# Patient Record
Sex: MALE | Race: WHITE | NOT HISPANIC OR LATINO | Employment: OTHER
[De-identification: names, ages, dates, MRNs, and addresses within clinical notes are randomized per-mention and may not be internally consistent; named-entity substitution may affect disease eponyms.]

---

## 2017-01-04 ENCOUNTER — RECORDS - HEALTHEAST (OUTPATIENT)
Dept: ADMINISTRATIVE | Facility: OTHER | Age: 67
End: 2017-01-04

## 2017-01-31 ENCOUNTER — RECORDS - HEALTHEAST (OUTPATIENT)
Dept: ADMINISTRATIVE | Facility: OTHER | Age: 67
End: 2017-01-31

## 2017-02-07 ENCOUNTER — RECORDS - HEALTHEAST (OUTPATIENT)
Dept: ADMINISTRATIVE | Facility: OTHER | Age: 67
End: 2017-02-07

## 2017-02-14 ENCOUNTER — COMMUNICATION - HEALTHEAST (OUTPATIENT)
Dept: PULMONOLOGY | Facility: OTHER | Age: 67
End: 2017-02-14

## 2017-02-14 ENCOUNTER — RECORDS - HEALTHEAST (OUTPATIENT)
Dept: ADMINISTRATIVE | Facility: OTHER | Age: 67
End: 2017-02-14

## 2017-02-17 ENCOUNTER — AMBULATORY - HEALTHEAST (OUTPATIENT)
Dept: PULMONOLOGY | Facility: OTHER | Age: 67
End: 2017-02-17

## 2017-02-17 ENCOUNTER — COMMUNICATION - HEALTHEAST (OUTPATIENT)
Dept: PULMONOLOGY | Facility: OTHER | Age: 67
End: 2017-02-17

## 2017-02-17 DIAGNOSIS — R05.3 PERSISTENT COUGH: ICD-10-CM

## 2017-03-13 ENCOUNTER — RECORDS - HEALTHEAST (OUTPATIENT)
Dept: ADMINISTRATIVE | Facility: OTHER | Age: 67
End: 2017-03-13

## 2017-03-13 ENCOUNTER — RECORDS - HEALTHEAST (OUTPATIENT)
Dept: PULMONOLOGY | Facility: OTHER | Age: 67
End: 2017-03-13

## 2017-03-13 ENCOUNTER — OFFICE VISIT - HEALTHEAST (OUTPATIENT)
Dept: PULMONOLOGY | Facility: OTHER | Age: 67
End: 2017-03-13

## 2017-03-13 DIAGNOSIS — R05.3 CHRONIC COUGH: ICD-10-CM

## 2017-03-13 DIAGNOSIS — R05.8 POST-VIRAL COUGH SYNDROME: ICD-10-CM

## 2017-03-13 DIAGNOSIS — R05.8 UPPER AIRWAY COUGH SYNDROME: ICD-10-CM

## 2017-03-13 DIAGNOSIS — R05.9 COUGH: ICD-10-CM

## 2017-03-13 ASSESSMENT — MIFFLIN-ST. JEOR: SCORE: 1607.7

## 2017-04-20 ENCOUNTER — COMMUNICATION - HEALTHEAST (OUTPATIENT)
Dept: CARDIOLOGY | Facility: CLINIC | Age: 67
End: 2017-04-20

## 2017-04-25 ENCOUNTER — AMBULATORY - HEALTHEAST (OUTPATIENT)
Dept: CARDIOLOGY | Facility: CLINIC | Age: 67
End: 2017-04-25

## 2017-04-25 ENCOUNTER — OFFICE VISIT - HEALTHEAST (OUTPATIENT)
Dept: CARDIOLOGY | Facility: CLINIC | Age: 67
End: 2017-04-25

## 2017-04-25 DIAGNOSIS — I25.118 CORONARY ARTERY DISEASE OF NATIVE ARTERY OF NATIVE HEART WITH STABLE ANGINA PECTORIS (H): ICD-10-CM

## 2017-04-25 DIAGNOSIS — I10 ESSENTIAL HYPERTENSION WITH GOAL BLOOD PRESSURE LESS THAN 140/90: ICD-10-CM

## 2017-04-25 DIAGNOSIS — E78.2 MIXED HYPERLIPIDEMIA: ICD-10-CM

## 2017-04-25 ASSESSMENT — MIFFLIN-ST. JEOR: SCORE: 1606.33

## 2017-04-28 ENCOUNTER — COMMUNICATION - HEALTHEAST (OUTPATIENT)
Dept: CARDIOLOGY | Facility: CLINIC | Age: 67
End: 2017-04-28

## 2017-05-01 ENCOUNTER — OFFICE VISIT - HEALTHEAST (OUTPATIENT)
Dept: PULMONOLOGY | Facility: OTHER | Age: 67
End: 2017-05-01

## 2017-05-01 DIAGNOSIS — R05.8 UPPER AIRWAY COUGH SYNDROME: ICD-10-CM

## 2017-05-01 DIAGNOSIS — R05.8 POST-VIRAL COUGH SYNDROME: ICD-10-CM

## 2017-05-01 DIAGNOSIS — R00.2 PALPITATIONS: ICD-10-CM

## 2017-05-04 ENCOUNTER — HOSPITAL ENCOUNTER (OUTPATIENT)
Dept: NUCLEAR MEDICINE | Facility: HOSPITAL | Age: 67
Discharge: HOME | End: 2017-05-04
Attending: INTERNAL MEDICINE

## 2017-05-04 ENCOUNTER — HOSPITAL ENCOUNTER (OUTPATIENT)
Dept: CARDIOLOGY | Facility: HOSPITAL | Age: 67
Discharge: HOME | End: 2017-05-04
Attending: INTERNAL MEDICINE

## 2017-05-04 DIAGNOSIS — I25.118 CORONARY ARTERY DISEASE OF NATIVE ARTERY OF NATIVE HEART WITH STABLE ANGINA PECTORIS (H): ICD-10-CM

## 2017-05-04 LAB
CV STRESS CURRENT BP HE: NORMAL
CV STRESS CURRENT HR HE: 103
CV STRESS CURRENT HR HE: 104
CV STRESS CURRENT HR HE: 105
CV STRESS CURRENT HR HE: 107
CV STRESS CURRENT HR HE: 119
CV STRESS CURRENT HR HE: 132
CV STRESS CURRENT HR HE: 132
CV STRESS CURRENT HR HE: 133
CV STRESS CURRENT HR HE: 134
CV STRESS CURRENT HR HE: 135
CV STRESS CURRENT HR HE: 137
CV STRESS CURRENT HR HE: 137
CV STRESS CURRENT HR HE: 138
CV STRESS CURRENT HR HE: 142
CV STRESS CURRENT HR HE: 142
CV STRESS CURRENT HR HE: 148
CV STRESS CURRENT HR HE: 149
CV STRESS CURRENT HR HE: 73
CV STRESS CURRENT HR HE: 79
CV STRESS CURRENT HR HE: 87
CV STRESS CURRENT HR HE: 88
CV STRESS CURRENT HR HE: 89
CV STRESS CURRENT HR HE: 90
CV STRESS CURRENT HR HE: 93
CV STRESS CURRENT HR HE: 96
CV STRESS CURRENT HR HE: 96
CV STRESS CURRENT HR HE: 97
CV STRESS CURRENT HR HE: 99
CV STRESS DEVIATION TIME HE: NORMAL
CV STRESS ECHO PERCENT HR HE: NORMAL
CV STRESS EXERCISE STAGE HE: NORMAL
CV STRESS EXERCISE STAGE REACHED HE: NORMAL
CV STRESS FINAL RESTING BP HE: NORMAL
CV STRESS FINAL RESTING HR HE: 87
CV STRESS MAX HR HE: 150
CV STRESS MAX TREADMILL GRADE HE: 14
CV STRESS MAX TREADMILL SPEED HE: 3.4
CV STRESS PEAK DIA BP HE: NORMAL
CV STRESS PEAK SYS BP HE: NORMAL
CV STRESS PHASE HE: NORMAL
CV STRESS PROTOCOL HE: NORMAL
CV STRESS RESTING PT POSITION HE: NORMAL
CV STRESS RESTING PT POSITION HE: NORMAL
CV STRESS ST DEVIATION AMOUNT HE: NORMAL
CV STRESS ST DEVIATION ELEVATION HE: NORMAL
CV STRESS ST EVELATION AMOUNT HE: NORMAL
CV STRESS TEST TYPE HE: NORMAL
CV STRESS TOTAL STAGE TIME MIN 1 HE: NORMAL
NUC STRESS EJECTION FRACTION: 53 %
STRESS ECHO BASELINE BP: NORMAL
STRESS ECHO BASELINE HR: 74
STRESS ECHO CALCULATED PERCENT HR: 96 %
STRESS ECHO LAST STRESS BP: NORMAL
STRESS ECHO LAST STRESS HR: 149
STRESS ECHO POST ESTIMATED WORKLOAD: 8.9
STRESS ECHO POST EXERCISE DUR MIN: 7
STRESS ECHO POST EXERCISE DUR SEC: 25
STRESS ECHO TARGET HR: 133

## 2017-07-14 ENCOUNTER — OFFICE VISIT - HEALTHEAST (OUTPATIENT)
Dept: CARDIOLOGY | Facility: CLINIC | Age: 67
End: 2017-07-14

## 2017-07-14 DIAGNOSIS — I25.10 CORONARY ARTERY DISEASE INVOLVING NATIVE CORONARY ARTERY OF NATIVE HEART WITHOUT ANGINA PECTORIS: ICD-10-CM

## 2017-07-14 DIAGNOSIS — I49.3 FREQUENT PVCS: ICD-10-CM

## 2017-07-14 DIAGNOSIS — I10 ESSENTIAL HYPERTENSION WITH GOAL BLOOD PRESSURE LESS THAN 140/90: ICD-10-CM

## 2017-07-14 DIAGNOSIS — I25.10 ATHEROSCLEROSIS OF NATIVE CORONARY ARTERY OF NATIVE HEART WITHOUT ANGINA PECTORIS: ICD-10-CM

## 2017-07-14 ASSESSMENT — MIFFLIN-ST. JEOR: SCORE: 1623.79

## 2018-02-21 ENCOUNTER — COMMUNICATION - HEALTHEAST (OUTPATIENT)
Dept: CARDIOLOGY | Facility: CLINIC | Age: 68
End: 2018-02-21

## 2018-02-21 DIAGNOSIS — I25.118 CORONARY ARTERY DISEASE OF NATIVE ARTERY OF NATIVE HEART WITH STABLE ANGINA PECTORIS (H): ICD-10-CM

## 2018-03-06 ENCOUNTER — COMMUNICATION - HEALTHEAST (OUTPATIENT)
Dept: CARDIOLOGY | Facility: CLINIC | Age: 68
End: 2018-03-06

## 2018-03-06 DIAGNOSIS — Z79.899 MEDICATION THERAPY CHANGED: ICD-10-CM

## 2018-03-06 DIAGNOSIS — I10 HTN (HYPERTENSION): ICD-10-CM

## 2018-05-08 ENCOUNTER — COMMUNICATION - HEALTHEAST (OUTPATIENT)
Dept: ADMINISTRATIVE | Facility: CLINIC | Age: 68
End: 2018-05-08

## 2018-05-23 ENCOUNTER — COMMUNICATION - HEALTHEAST (OUTPATIENT)
Dept: ADMINISTRATIVE | Facility: CLINIC | Age: 68
End: 2018-05-23

## 2018-08-28 ENCOUNTER — OFFICE VISIT - HEALTHEAST (OUTPATIENT)
Dept: CARDIOLOGY | Facility: CLINIC | Age: 68
End: 2018-08-28

## 2018-08-28 DIAGNOSIS — I10 ESSENTIAL HYPERTENSION: ICD-10-CM

## 2018-08-28 DIAGNOSIS — I49.3 FREQUENT PVCS: ICD-10-CM

## 2018-08-28 DIAGNOSIS — I25.10 ATHEROSCLEROSIS OF NATIVE CORONARY ARTERY OF NATIVE HEART WITHOUT ANGINA PECTORIS: ICD-10-CM

## 2018-08-28 ASSESSMENT — MIFFLIN-ST. JEOR: SCORE: 1613.14

## 2018-09-04 ENCOUNTER — COMMUNICATION - HEALTHEAST (OUTPATIENT)
Dept: CARDIOLOGY | Facility: CLINIC | Age: 68
End: 2018-09-04

## 2018-09-04 DIAGNOSIS — I25.118 CORONARY ARTERY DISEASE OF NATIVE ARTERY OF NATIVE HEART WITH STABLE ANGINA PECTORIS (H): ICD-10-CM

## 2018-09-05 RX ORDER — ISOSORBIDE MONONITRATE 60 MG/1
TABLET, EXTENDED RELEASE ORAL
Qty: 90 TABLET | Refills: 2 | Status: SHIPPED | OUTPATIENT
Start: 2018-09-05 | End: 2021-11-19

## 2018-10-30 ENCOUNTER — COMMUNICATION - HEALTHEAST (OUTPATIENT)
Dept: CARDIOLOGY | Facility: CLINIC | Age: 68
End: 2018-10-30

## 2018-10-30 DIAGNOSIS — I25.10 CORONARY ARTERY DISEASE INVOLVING NATIVE CORONARY ARTERY OF NATIVE HEART WITHOUT ANGINA PECTORIS: ICD-10-CM

## 2019-04-09 ENCOUNTER — COMMUNICATION - HEALTHEAST (OUTPATIENT)
Dept: CARDIOLOGY | Facility: CLINIC | Age: 69
End: 2019-04-09

## 2019-04-09 DIAGNOSIS — I10 HTN (HYPERTENSION): ICD-10-CM

## 2019-04-09 RX ORDER — LOSARTAN POTASSIUM 25 MG/1
25 TABLET ORAL DAILY
Qty: 90 TABLET | Refills: 1 | Status: SHIPPED | OUTPATIENT
Start: 2019-04-09

## 2019-05-09 ENCOUNTER — COMMUNICATION - HEALTHEAST (OUTPATIENT)
Dept: CARDIOLOGY | Facility: CLINIC | Age: 69
End: 2019-05-09

## 2019-05-09 DIAGNOSIS — I25.10 CORONARY ARTERY DISEASE INVOLVING NATIVE CORONARY ARTERY OF NATIVE HEART WITHOUT ANGINA PECTORIS: ICD-10-CM

## 2019-08-06 ENCOUNTER — RECORDS - HEALTHEAST (OUTPATIENT)
Dept: ADMINISTRATIVE | Facility: OTHER | Age: 69
End: 2019-08-06

## 2019-08-13 ENCOUNTER — COMMUNICATION - HEALTHEAST (OUTPATIENT)
Dept: TELEHEALTH | Facility: CLINIC | Age: 69
End: 2019-08-13

## 2019-08-14 ENCOUNTER — OFFICE VISIT - HEALTHEAST (OUTPATIENT)
Dept: CARDIOLOGY | Facility: TELEHEALTH | Age: 69
End: 2019-08-14

## 2019-08-14 DIAGNOSIS — E78.2 MIXED HYPERLIPIDEMIA: ICD-10-CM

## 2019-08-14 DIAGNOSIS — I49.3 FREQUENT PVCS: ICD-10-CM

## 2019-08-14 DIAGNOSIS — I10 ESSENTIAL HYPERTENSION: ICD-10-CM

## 2019-08-14 DIAGNOSIS — I25.10 ATHEROSCLEROSIS OF NATIVE CORONARY ARTERY OF NATIVE HEART WITHOUT ANGINA PECTORIS: ICD-10-CM

## 2019-08-14 ASSESSMENT — MIFFLIN-ST. JEOR: SCORE: 1608.61

## 2019-08-23 ENCOUNTER — HOSPITAL ENCOUNTER (OUTPATIENT)
Dept: CARDIOLOGY | Facility: HOSPITAL | Age: 69
Discharge: HOME | End: 2019-08-23
Attending: INTERNAL MEDICINE

## 2019-08-23 ENCOUNTER — HOSPITAL ENCOUNTER (OUTPATIENT)
Dept: NUCLEAR MEDICINE | Facility: HOSPITAL | Age: 69
Discharge: HOME | End: 2019-08-23
Attending: INTERNAL MEDICINE

## 2019-08-23 DIAGNOSIS — I49.3 FREQUENT PVCS: ICD-10-CM

## 2019-08-23 DIAGNOSIS — I25.10 ATHEROSCLEROSIS OF NATIVE CORONARY ARTERY OF NATIVE HEART WITHOUT ANGINA PECTORIS: ICD-10-CM

## 2019-08-23 DIAGNOSIS — E78.2 MIXED HYPERLIPIDEMIA: ICD-10-CM

## 2019-08-23 DIAGNOSIS — I10 ESSENTIAL HYPERTENSION: ICD-10-CM

## 2019-08-23 LAB
CV STRESS CURRENT BP HE: NORMAL
CV STRESS CURRENT HR HE: 100
CV STRESS CURRENT HR HE: 103
CV STRESS CURRENT HR HE: 106
CV STRESS CURRENT HR HE: 110
CV STRESS CURRENT HR HE: 111
CV STRESS CURRENT HR HE: 112
CV STRESS CURRENT HR HE: 112
CV STRESS CURRENT HR HE: 123
CV STRESS CURRENT HR HE: 128
CV STRESS CURRENT HR HE: 132
CV STRESS CURRENT HR HE: 135
CV STRESS CURRENT HR HE: 141
CV STRESS CURRENT HR HE: 142
CV STRESS CURRENT HR HE: 142
CV STRESS CURRENT HR HE: 144
CV STRESS CURRENT HR HE: 146
CV STRESS CURRENT HR HE: 146
CV STRESS CURRENT HR HE: 147
CV STRESS CURRENT HR HE: 75
CV STRESS CURRENT HR HE: 86
CV STRESS CURRENT HR HE: 86
CV STRESS CURRENT HR HE: 88
CV STRESS CURRENT HR HE: 88
CV STRESS CURRENT HR HE: 89
CV STRESS CURRENT HR HE: 90
CV STRESS CURRENT HR HE: 94
CV STRESS CURRENT HR HE: 95
CV STRESS CURRENT HR HE: 96
CV STRESS CURRENT HR HE: 99
CV STRESS DEVIATION TIME HE: NORMAL
CV STRESS ECHO PERCENT HR HE: NORMAL
CV STRESS EXERCISE STAGE HE: NORMAL
CV STRESS EXERCISE STAGE REACHED HE: NORMAL
CV STRESS FINAL RESTING BP HE: NORMAL
CV STRESS FINAL RESTING HR HE: 88
CV STRESS MAX HR HE: 147
CV STRESS MAX TREADMILL GRADE HE: 12
CV STRESS MAX TREADMILL SPEED HE: 2.5
CV STRESS PEAK DIA BP HE: NORMAL
CV STRESS PEAK SYS BP HE: NORMAL
CV STRESS PHASE HE: NORMAL
CV STRESS PROTOCOL HE: NORMAL
CV STRESS RESTING PT POSITION HE: NORMAL
CV STRESS RESTING PT POSITION HE: NORMAL
CV STRESS ST DEVIATION AMOUNT HE: NORMAL
CV STRESS ST DEVIATION ELEVATION HE: NORMAL
CV STRESS ST EVELATION AMOUNT HE: NORMAL
CV STRESS TEST TYPE HE: NORMAL
CV STRESS TOTAL STAGE TIME MIN 1 HE: NORMAL
NUC STRESS EJECTION FRACTION: 62 %
STRESS ECHO BASELINE BP: NORMAL
STRESS ECHO BASELINE HR: 75
STRESS ECHO CALCULATED PERCENT HR: 95 %
STRESS ECHO LAST STRESS BP: NORMAL
STRESS ECHO LAST STRESS HR: 146
STRESS ECHO POST ESTIMATED WORKLOAD: 7.1
STRESS ECHO POST EXERCISE DUR MIN: 6
STRESS ECHO POST EXERCISE DUR SEC: 7
STRESS ECHO TARGET HR: 131

## 2019-08-23 ASSESSMENT — MIFFLIN-ST. JEOR: SCORE: 1570.05

## 2019-08-26 ENCOUNTER — AMBULATORY - HEALTHEAST (OUTPATIENT)
Dept: CARDIOLOGY | Facility: CLINIC | Age: 69
End: 2019-08-26

## 2019-08-26 DIAGNOSIS — I25.10 ATHEROSCLEROSIS OF NATIVE CORONARY ARTERY OF NATIVE HEART WITHOUT ANGINA PECTORIS: ICD-10-CM

## 2019-08-27 ENCOUNTER — SURGERY - HEALTHEAST (OUTPATIENT)
Dept: CARDIOLOGY | Facility: CLINIC | Age: 69
End: 2019-08-27

## 2019-08-27 ENCOUNTER — COMMUNICATION - HEALTHEAST (OUTPATIENT)
Dept: CARDIOLOGY | Facility: CLINIC | Age: 69
End: 2019-08-27

## 2019-08-28 ENCOUNTER — SURGERY - HEALTHEAST (OUTPATIENT)
Dept: CARDIOLOGY | Facility: CLINIC | Age: 69
End: 2019-08-28

## 2019-08-28 ASSESSMENT — MIFFLIN-ST. JEOR: SCORE: 1561.88

## 2019-08-30 ENCOUNTER — COMMUNICATION - HEALTHEAST (OUTPATIENT)
Dept: CARDIOLOGY | Facility: CLINIC | Age: 69
End: 2019-08-30

## 2019-08-30 DIAGNOSIS — I25.10 ATHEROSCLEROSIS OF NATIVE CORONARY ARTERY OF NATIVE HEART WITHOUT ANGINA PECTORIS: ICD-10-CM

## 2019-08-30 DIAGNOSIS — E78.2 MIXED HYPERLIPIDEMIA: ICD-10-CM

## 2019-08-30 DIAGNOSIS — I10 ESSENTIAL HYPERTENSION: ICD-10-CM

## 2019-12-04 ENCOUNTER — COMMUNICATION - HEALTHEAST (OUTPATIENT)
Dept: CARDIOLOGY | Facility: CLINIC | Age: 69
End: 2019-12-04

## 2019-12-04 DIAGNOSIS — I25.10 ATHEROSCLEROSIS OF NATIVE CORONARY ARTERY OF NATIVE HEART WITHOUT ANGINA PECTORIS: ICD-10-CM

## 2019-12-04 DIAGNOSIS — I10 ESSENTIAL HYPERTENSION: ICD-10-CM

## 2020-02-17 ENCOUNTER — AMBULATORY - HEALTHEAST (OUTPATIENT)
Dept: CARDIOLOGY | Facility: CLINIC | Age: 70
End: 2020-02-17

## 2020-02-19 ENCOUNTER — COMMUNICATION - HEALTHEAST (OUTPATIENT)
Dept: CARDIOLOGY | Facility: CLINIC | Age: 70
End: 2020-02-19

## 2020-02-19 DIAGNOSIS — E78.1 HYPERTRIGLYCERIDEMIA: ICD-10-CM

## 2020-02-25 ENCOUNTER — AMBULATORY - HEALTHEAST (OUTPATIENT)
Dept: CARDIOLOGY | Facility: CLINIC | Age: 70
End: 2020-02-25

## 2020-03-06 ENCOUNTER — RECORDS - HEALTHEAST (OUTPATIENT)
Dept: CARDIOLOGY | Facility: CLINIC | Age: 70
End: 2020-03-06

## 2020-03-06 ENCOUNTER — COMMUNICATION - HEALTHEAST (OUTPATIENT)
Dept: CARDIOLOGY | Facility: CLINIC | Age: 70
End: 2020-03-06

## 2020-03-06 DIAGNOSIS — E78.2 MIXED HYPERLIPIDEMIA: ICD-10-CM

## 2020-03-23 ENCOUNTER — COMMUNICATION - HEALTHEAST (OUTPATIENT)
Dept: CARDIOLOGY | Facility: CLINIC | Age: 70
End: 2020-03-23

## 2020-03-23 DIAGNOSIS — E78.2 MIXED HYPERLIPIDEMIA: ICD-10-CM

## 2020-06-11 ENCOUNTER — COMMUNICATION - HEALTHEAST (OUTPATIENT)
Dept: CARDIOLOGY | Facility: CLINIC | Age: 70
End: 2020-06-11

## 2020-06-11 DIAGNOSIS — I10 ESSENTIAL HYPERTENSION: ICD-10-CM

## 2020-06-11 DIAGNOSIS — I25.10 ATHEROSCLEROSIS OF NATIVE CORONARY ARTERY OF NATIVE HEART WITHOUT ANGINA PECTORIS: ICD-10-CM

## 2020-08-14 ENCOUNTER — COMMUNICATION - HEALTHEAST (OUTPATIENT)
Dept: CARDIOLOGY | Facility: CLINIC | Age: 70
End: 2020-08-14

## 2020-08-14 DIAGNOSIS — R00.2 PALPITATIONS: ICD-10-CM

## 2020-08-19 ENCOUNTER — COMMUNICATION - HEALTHEAST (OUTPATIENT)
Dept: CARDIOLOGY | Facility: CLINIC | Age: 70
End: 2020-08-19

## 2020-08-20 ENCOUNTER — OFFICE VISIT - HEALTHEAST (OUTPATIENT)
Dept: CARDIOLOGY | Facility: CLINIC | Age: 70
End: 2020-08-20

## 2020-08-20 DIAGNOSIS — I25.10 ATHEROSCLEROSIS OF NATIVE CORONARY ARTERY OF NATIVE HEART WITHOUT ANGINA PECTORIS: ICD-10-CM

## 2020-08-20 DIAGNOSIS — I49.3 FREQUENT PVCS: ICD-10-CM

## 2020-08-20 DIAGNOSIS — I10 ESSENTIAL HYPERTENSION: ICD-10-CM

## 2020-08-20 DIAGNOSIS — Z90.79 S/P PROSTATECTOMY: ICD-10-CM

## 2020-08-20 DIAGNOSIS — R00.2 PALPITATIONS: ICD-10-CM

## 2020-08-20 LAB
ATRIAL RATE - MUSE: 89 BPM
DIASTOLIC BLOOD PRESSURE - MUSE: NORMAL
INTERPRETATION ECG - MUSE: NORMAL
P AXIS - MUSE: 46 DEGREES
PR INTERVAL - MUSE: 156 MS
QRS DURATION - MUSE: 100 MS
QT - MUSE: 390 MS
QTC - MUSE: 474 MS
R AXIS - MUSE: 33 DEGREES
SYSTOLIC BLOOD PRESSURE - MUSE: NORMAL
T AXIS - MUSE: 8 DEGREES
VENTRICULAR RATE- MUSE: 89 BPM

## 2020-08-20 ASSESSMENT — MIFFLIN-ST. JEOR: SCORE: 1597.26

## 2020-12-04 ENCOUNTER — COMMUNICATION - HEALTHEAST (OUTPATIENT)
Dept: CARDIOLOGY | Facility: CLINIC | Age: 70
End: 2020-12-04

## 2020-12-04 DIAGNOSIS — I10 ESSENTIAL HYPERTENSION: ICD-10-CM

## 2020-12-04 DIAGNOSIS — I25.10 ATHEROSCLEROSIS OF NATIVE CORONARY ARTERY OF NATIVE HEART WITHOUT ANGINA PECTORIS: ICD-10-CM

## 2020-12-07 RX ORDER — METOPROLOL SUCCINATE 100 MG/1
100 TABLET, EXTENDED RELEASE ORAL DAILY
Qty: 90 TABLET | Refills: 1 | Status: SHIPPED | OUTPATIENT
Start: 2020-12-07

## 2021-01-04 ENCOUNTER — COMMUNICATION - HEALTHEAST (OUTPATIENT)
Dept: CARDIOLOGY | Facility: CLINIC | Age: 71
End: 2021-01-04

## 2021-01-04 DIAGNOSIS — E78.2 MIXED HYPERLIPIDEMIA: ICD-10-CM

## 2021-01-05 RX ORDER — ROSUVASTATIN CALCIUM 20 MG/1
20 TABLET, COATED ORAL AT BEDTIME
Qty: 90 TABLET | Refills: 1 | Status: ON HOLD | OUTPATIENT
Start: 2021-01-05 | End: 2023-03-22

## 2021-02-08 ENCOUNTER — COMMUNICATION - HEALTHEAST (OUTPATIENT)
Dept: CARDIOLOGY | Facility: CLINIC | Age: 71
End: 2021-02-08

## 2021-02-09 ENCOUNTER — OFFICE VISIT - HEALTHEAST (OUTPATIENT)
Dept: CARDIOLOGY | Facility: CLINIC | Age: 71
End: 2021-02-09

## 2021-02-09 ENCOUNTER — RECORDS - HEALTHEAST (OUTPATIENT)
Dept: ADMINISTRATIVE | Facility: OTHER | Age: 71
End: 2021-02-09

## 2021-02-09 DIAGNOSIS — I25.10 ATHEROSCLEROSIS OF NATIVE CORONARY ARTERY OF NATIVE HEART WITHOUT ANGINA PECTORIS: ICD-10-CM

## 2021-02-09 DIAGNOSIS — R06.09 DYSPNEA ON EXERTION: ICD-10-CM

## 2021-02-09 DIAGNOSIS — I49.3 FREQUENT PVCS: ICD-10-CM

## 2021-02-09 RX ORDER — DEXAMETHASONE 4 MG/1
TABLET ORAL
Status: SHIPPED | COMMUNITY
Start: 2021-02-03 | End: 2022-02-10

## 2021-02-09 RX ORDER — ONDANSETRON 4 MG/1
4 TABLET, ORALLY DISINTEGRATING ORAL
Status: SHIPPED | COMMUNITY
Start: 2021-02-03

## 2021-02-10 ENCOUNTER — AMBULATORY - HEALTHEAST (OUTPATIENT)
Dept: CARDIOLOGY | Facility: CLINIC | Age: 71
End: 2021-02-10

## 2021-02-10 DIAGNOSIS — I25.10 ATHEROSCLEROSIS OF NATIVE CORONARY ARTERY OF NATIVE HEART WITHOUT ANGINA PECTORIS: ICD-10-CM

## 2021-02-12 ENCOUNTER — HOSPITAL ENCOUNTER (OUTPATIENT)
Dept: CARDIOLOGY | Facility: HOSPITAL | Age: 71
Discharge: HOME | End: 2021-02-12
Attending: INTERNAL MEDICINE

## 2021-02-12 ENCOUNTER — HOSPITAL ENCOUNTER (OUTPATIENT)
Dept: NUCLEAR MEDICINE | Facility: HOSPITAL | Age: 71
Discharge: HOME | End: 2021-02-12
Attending: INTERNAL MEDICINE

## 2021-02-12 ENCOUNTER — COMMUNICATION - HEALTHEAST (OUTPATIENT)
Dept: ADMINISTRATIVE | Facility: CLINIC | Age: 71
End: 2021-02-12

## 2021-02-12 DIAGNOSIS — I25.10 ATHEROSCLEROSIS OF NATIVE CORONARY ARTERY OF NATIVE HEART WITHOUT ANGINA PECTORIS: ICD-10-CM

## 2021-02-12 DIAGNOSIS — I49.3 FREQUENT PVCS: ICD-10-CM

## 2021-02-12 DIAGNOSIS — R06.09 DYSPNEA ON EXERTION: ICD-10-CM

## 2021-02-12 LAB
AORTIC ROOT: 4.3 CM
AORTIC VALVE MEAN VELOCITY: 94.9 CM/S
ASCENDING AORTA: 3.8 CM
AV DIMENSIONLESS INDEX VTI: 1
AV MEAN GRADIENT: 4 MMHG
AV PEAK GRADIENT: 6.2 MMHG
AV VALVE AREA: 3.7 CM2
AV VELOCITY RATIO: 1
BSA FOR ECHO PROCEDURE: 1.89 M2
CV BLOOD PRESSURE: ABNORMAL MMHG
CV ECHO HEIGHT: 68 IN
CV ECHO WEIGHT: 165 LBS
CV STRESS CURRENT BP HE: NORMAL
CV STRESS CURRENT HR HE: 100
CV STRESS CURRENT HR HE: 101
CV STRESS CURRENT HR HE: 65
CV STRESS CURRENT HR HE: 67
CV STRESS CURRENT HR HE: 80
CV STRESS CURRENT HR HE: 80
CV STRESS CURRENT HR HE: 82
CV STRESS CURRENT HR HE: 82
CV STRESS CURRENT HR HE: 83
CV STRESS CURRENT HR HE: 84
CV STRESS CURRENT HR HE: 89
CV STRESS CURRENT HR HE: 90
CV STRESS CURRENT HR HE: 90
CV STRESS CURRENT HR HE: 91
CV STRESS CURRENT HR HE: 92
CV STRESS CURRENT HR HE: 93
CV STRESS CURRENT HR HE: 96
CV STRESS DEVIATION TIME HE: NORMAL
CV STRESS ECHO PERCENT HR HE: NORMAL
CV STRESS EXERCISE STAGE HE: NORMAL
CV STRESS FINAL RESTING BP HE: NORMAL
CV STRESS FINAL RESTING HR HE: 84
CV STRESS MAX HR HE: 102
CV STRESS MAX TREADMILL GRADE HE: 0
CV STRESS MAX TREADMILL SPEED HE: 0
CV STRESS PEAK DIA BP HE: NORMAL
CV STRESS PEAK SYS BP HE: NORMAL
CV STRESS PHASE HE: NORMAL
CV STRESS PROTOCOL HE: NORMAL
CV STRESS RESTING PT POSITION HE: NORMAL
CV STRESS ST DEVIATION AMOUNT HE: NORMAL
CV STRESS ST DEVIATION ELEVATION HE: NORMAL
CV STRESS ST EVELATION AMOUNT HE: NORMAL
CV STRESS TEST TYPE HE: NORMAL
CV STRESS TOTAL STAGE TIME MIN 1 HE: NORMAL
DOP CALC AO PEAK VEL: 124 CM/S
DOP CALC AO VTI: 22.2 CM
DOP CALC LVOT AREA: 3.8 CM2
DOP CALC LVOT DIAMETER: 2.2 CM
DOP CALC LVOT PEAK VEL: 123 CM/S
DOP CALC LVOT STROKE VOLUME: 81.3 CM3
DOP CALCLVOT PEAK VEL VTI: 21.4 CM
EJECTION FRACTION: 63 % (ref 55–75)
FRACTIONAL SHORTENING: 24 % (ref 28–44)
INTERVENTRICULAR SEPTUM IN END DIASTOLE: 1.31 CM (ref 0.6–1)
IVS/PW RATIO: 1
LA AREA 1: 19 CM2
LA AREA 2: 22 CM2
LEFT ATRIUM LENGTH: 5.7 CM
LEFT ATRIUM SIZE: 3.9 CM
LEFT ATRIUM TO AORTIC ROOT RATIO: 0.91 NO UNITS
LEFT ATRIUM VOLUME INDEX: 33 ML/M2
LEFT ATRIUM VOLUME: 62.3 ML
LEFT VENTRICLE CARDIAC INDEX: 2.3 L/MIN/M2
LEFT VENTRICLE CARDIAC OUTPUT: 4.4 L/MIN
LEFT VENTRICLE DIASTOLIC VOLUME INDEX: 45.2 CM3/M2 (ref 34–74)
LEFT VENTRICLE DIASTOLIC VOLUME: 85.4 CM3 (ref 62–150)
LEFT VENTRICLE HEART RATE: 54 BPM
LEFT VENTRICLE MASS INDEX: 116.4 G/M2
LEFT VENTRICLE SYSTOLIC VOLUME INDEX: 16.8 CM3/M2 (ref 11–31)
LEFT VENTRICLE SYSTOLIC VOLUME: 31.8 CM3 (ref 21–61)
LEFT VENTRICULAR INTERNAL DIMENSION IN DIASTOLE: 4.45 CM (ref 4.2–5.8)
LEFT VENTRICULAR INTERNAL DIMENSION IN SYSTOLE: 3.38 CM (ref 2.5–4)
LEFT VENTRICULAR MASS: 220.1 G
LEFT VENTRICULAR OUTFLOW TRACT MEAN GRADIENT: 3 MMHG
LEFT VENTRICULAR OUTFLOW TRACT MEAN VELOCITY: 81.5 CM/S
LEFT VENTRICULAR OUTFLOW TRACT PEAK GRADIENT: 6 MMHG
LEFT VENTRICULAR POSTERIOR WALL IN END DIASTOLE: 1.3 CM (ref 0.6–1)
LV STROKE VOLUME INDEX: 43 ML/M2
MITRAL VALVE DECELERATION SLOPE: 1640 MM/S2
MITRAL VALVE DECELERATION SLOPE: 1640 MM/S2
MITRAL VALVE E/A RATIO: 0.6
MITRAL VALVE PRESSURE HALF-TIME: 94 MS
MV AVERAGE E/E' RATIO: 6.3 CM/S
MV DECELERATION TIME: 320 MS
MV E'TISSUE VEL-LAT: 9.38 CM/S
MV E'TISSUE VEL-MED: 7.16 CM/S
MV LATERAL E/E' RATIO: 5.6
MV MEDIAL E/E' RATIO: 7.3
MV PEAK A VELOCITY: 85.9 CM/S
MV PEAK E VELOCITY: 52.4 CM/S
MV VALVE AREA PRESSURE 1/2 METHOD: 2.3 CM2
NUC REST DIASTOLIC VOLUME INDEX: 2640 LBS
NUC REST SYSTOLIC VOLUME INDEX: 68 IN
NUC STRESS EJECTION FRACTION: 74 %
RATE PRESSURE PRODUCT: NORMAL
STRESS ECHO BASELINE DIASTOLIC HE: 80
STRESS ECHO BASELINE HR: 72
STRESS ECHO BASELINE SYSTOLIC BP: 146
STRESS ECHO CALCULATED PERCENT HR: 67 %
STRESS ECHO LAST STRESS DIASTOLIC BP: 79
STRESS ECHO LAST STRESS HR: 92
STRESS ECHO LAST STRESS SYSTOLIC BP: 142
STRESS ECHO TARGET HR: 152
TRICUSPID VALVE ANULAR PLANE SYSTOLIC EXCURSION: 2.5 CM

## 2021-02-12 ASSESSMENT — MIFFLIN-ST. JEOR
SCORE: 1492.94
SCORE: 1492.94

## 2021-03-12 ENCOUNTER — TRANSFERRED RECORDS (OUTPATIENT)
Dept: HEALTH INFORMATION MANAGEMENT | Facility: CLINIC | Age: 71
End: 2021-03-12

## 2021-05-30 VITALS — BODY MASS INDEX: 28.89 KG/M2 | WEIGHT: 190 LBS

## 2021-05-30 VITALS — BODY MASS INDEX: 28.84 KG/M2 | WEIGHT: 190.3 LBS | HEIGHT: 68 IN

## 2021-05-30 VITALS — WEIGHT: 190 LBS | HEIGHT: 68 IN | BODY MASS INDEX: 28.79 KG/M2

## 2021-05-31 VITALS — HEIGHT: 70 IN | BODY MASS INDEX: 27 KG/M2 | WEIGHT: 188.6 LBS

## 2021-05-31 NOTE — TELEPHONE ENCOUNTER
Patient called back. Recommendations per Dr. Andres were reviewed. Patient verbalized understanding and agreement with plan.   Patient requests order for Lipids be sent to [facility] in [place].  Prescriptions sent to [facility].  Med List updated. -ejb

## 2021-05-31 NOTE — TELEPHONE ENCOUNTER
----- Message from Fawad Andres MD sent at 8/30/2019 10:24 AM CDT -----  Shantel,    Holter monitor does show PVCs but no worrisome findings.  This is comforting.  I know he had a recent angiogram which showed moderate coronary artery disease and from our perspective okay to go ahead with prostate surgery.    Thanks,    Fawad

## 2021-05-31 NOTE — TELEPHONE ENCOUNTER
Buddy LEELEE Branch  [address] [facility] [address]  [phone] (home)     Procedure cardiologist:  Dr. Roman or Dr. Garcia  PCP:  Felisa Knott MD  H&P completed by:  Dr. Andres, 8/14/19  Admit date 8/28/19  Arrival time:  0600  Anticoagulation: None  CPAP: Yes  Previous PCI: 2006 @ [facility]  Bypass Grafts: No  Renal Issues: No  Diabetic?: No  Device?: No  Type:  n/a    Angiogram Teaching    Reason for Visit:  Telephone call to discuss pre-procedure education in preparation for: Cors poss PCI    Procedure Prep:  Primary Cardiologist note dated: 8/14/19  EKG results obtained, dated: am of procedure  Hemogram results obtained: am of procedure  Basic Metabolic Panel results obtained: am of procedure  Lipid Profile results obtained: 6/21/19    Patient Education  Explained indications/risks for diagnostic evaluation, including one or more of the following:  left heart catheterization, right heart catheterization, coronary angiogram, left ventriculogram, percutaneous arteritomy closure, less than 0.1% of acute myocardial infarction and CVA or death or any of the following to the degree that it could threaten life:  allergic reaction, arrhythmia, renal failure, hemorrhage, thrombosis and infection  Explained indications/risks for therapeutic interventions, including one or more of the following: PTCA, artherectomy, stent, intravascular ultrasound, valvuloplasty, intraaortic balloon pump, 2% or less risk of MI, less than 1% risk of CVA, emergency heart surgery, death, less than 4 % risk of vascular injury requiring surgical repair or blood transfusion, 20-30% risk of restonosis with a bare metal stent, less than 10% risk of restonosis with a drug-eluting stent, 0.5-1% chance of stent thrombosis and may need clopidogrel (Plavix) form greater than 1 year.  These risks are in addition to baseline risks associated with a Diagnostic Evaluation.  Patient states understanding of procedure and risks and agrees to  proceed.    Pre-procedure instructions  Patient instructed to be NPO after midnight.  Patient instructed to shower the evening before or the morning of the procedure.  Patient instructed to arrange for transportation home following procedure from a responsible family member of friend. No driving for at least 24 hours.  Patient instructed to have a responsible adult with them for 24 hours post-procedure.  Post-procedure follow up process.  Conscious sedation discussed.    Pre-procedure medication instructions  Patient instructed on antiplatelet medication.  Continue medications as scheduled, with a small amount of water on the day of the procedure unless indicated.  Patient instructed to take 324 mg of Aspirin am of procedure: Yes  Other medication: instructed to only take Aspirin, Imdur, Losartan, and Metoprolol Succinate a.m. of the procedure.    *PATIENTS RECORDS AVAILABLE IN [facility] UNLESS OTHERWISE INDICATED*      Patient Active Problem List   Diagnosis     Mixed hyperlipidemia     Hypertension     Coronary Artery Disease     Frequent PVCs     Atherosclerosis of native coronary artery of native heart without angina pectoris       Current Outpatient Medications   Medication Sig Dispense Refill     aspirin 81 MG EC tablet Take 81 mg by mouth daily.       beclomethasone (QVAR) 80 mcg/actuation inhaler Inhale 1 puff 2 (two) times a day. 1 Inhaler 12     colchicine 0.6 mg tablet Take 0.6 mg by mouth daily.       ezetimibe (ZETIA) 10 mg tablet Take 10 mg by mouth daily.       fluticasone furoate (ARNUITY ELLIPTA) 200 mcg/actuation DsDv Inhale 200 mcg daily. 2 each 0     isosorbide mononitrate (IMDUR) 60 MG 24 hr tablet TAKE ONE TABLET BY MOUTH DAILY 90 tablet 2     losartan (COZAAR) 25 MG tablet TAKE 1 TABLET (25 MG TOTAL) BY MOUTH DAILY. 90 tablet 1     metoprolol succinate (TOPROL-XL) 50 MG 24 hr tablet TAKE 1 1/2 TABLETS (75MG TOTAL) BY MOUTH ONCE DAILY 135 tablet 1     nitroglycerin (NITROSTAT) 0.4 MG SL tablet Place  0.4 mg under the tongue every 5 (five) minutes as needed for chest pain.       omeprazole (PRILOSEC) 20 MG capsule Take 20 mg by mouth as needed (usually takes about 1x week).        pravastatin (PRAVACHOL) 80 MG tablet Take 80 mg by mouth bedtime.       triamcinolone (KENALOG) 0.1 % cream Apply topically 2 (two) times a day.       No current facility-administered medications for this visit.        Allergies   Allergen Reactions     Ace Inhibitors Cough         RICHARD Cabezas

## 2021-05-31 NOTE — TELEPHONE ENCOUNTER
----- Message from Thania Lin sent at 8/26/2019  4:43 PM CDT -----  Regarding: ROMULO ORDERING  CORS POSS PCI   KEEGAN/KATHERYN/CA   8/28-6AM ADMIT  H&P 8/14

## 2021-05-31 NOTE — TELEPHONE ENCOUNTER
Dr. Andres,    Called patient today with Holter monitor results. Patient reports that he is fairly symptomatic with these PVCs and wonders if a medication adjustment can be made to help reduce the amount of ectopic beats he is having. He currently take Metoprolol Succinate 75 mg daily.    Patient was also told that other medication adjustments may be made after his angiogram to help avoid progression of disease. He is currently on max dose Pravastatin as well as Ezetimibe 10 mg daily. Most recent lipids in chart from 6/2019. Would you like to make any changes or adjustments to his medication regimen? -yair

## 2021-05-31 NOTE — TELEPHONE ENCOUNTER
----- Message -----  From: Fawad Andres MD  Sent: 8/30/2019  12:42 PM  To: Shantel Cabezas RN    Okay to increase metoprolol to 100 mg each day.    Would stop ezetimibe and stop pravastatin.  Would recommend Crestor 20 mg each day with follow-up lipid panel in 2 to 3 months.    Thanks,    Fawad

## 2021-06-01 VITALS — WEIGHT: 188 LBS | HEIGHT: 69 IN | BODY MASS INDEX: 27.85 KG/M2

## 2021-06-03 VITALS — BODY MASS INDEX: 27.7 KG/M2 | HEIGHT: 69 IN | WEIGHT: 187 LBS

## 2021-06-03 VITALS — HEIGHT: 68 IN | BODY MASS INDEX: 27.31 KG/M2 | WEIGHT: 180.2 LBS

## 2021-06-03 VITALS — BODY MASS INDEX: 27.58 KG/M2 | WEIGHT: 182 LBS | HEIGHT: 68 IN

## 2021-06-04 VITALS
BODY MASS INDEX: 28.49 KG/M2 | WEIGHT: 188 LBS | DIASTOLIC BLOOD PRESSURE: 70 MMHG | SYSTOLIC BLOOD PRESSURE: 130 MMHG | HEART RATE: 88 BPM | RESPIRATION RATE: 14 BRPM | HEIGHT: 68 IN

## 2021-06-05 ENCOUNTER — RECORDS - HEALTHEAST (OUTPATIENT)
Dept: CARDIOLOGY | Facility: CLINIC | Age: 71
End: 2021-06-05

## 2021-06-05 VITALS — BODY MASS INDEX: 25.01 KG/M2 | WEIGHT: 165 LBS | HEIGHT: 68 IN

## 2021-06-05 DIAGNOSIS — R07.9 CHEST PAIN: ICD-10-CM

## 2021-06-05 DIAGNOSIS — I25.10 CORONARY ATHEROSCLEROSIS: ICD-10-CM

## 2021-06-06 NOTE — TELEPHONE ENCOUNTER
----- Message from Fawad Andres MD sent at 2/17/2020  4:32 PM CST -----  Shantel,His triglycerides are significantly elevated.  Alcohol in some patients, can increase triglycerides, thyroid abnormalities as well.  If he has not had a TSH in the last couple years, I think we should get 1.  He should also reduce carbohydrates in his diet.  If he could read reduce carbohydrates by 50%, substitute protein, his lipid panel would likely look better.  I am not sure where his weight is at this time but this would also likely result in some weight loss.  I would be happy to see him to discuss these as well.  We could also discuss possibly switching to Crestor from Pravachol.Thanks,Fawad        Spoke with patient. Reviewed recommendations per Dr. Andres. He verbalized understanding and notes he is already on Crestor 20 mg daily. Patient will work on diet modification and check TSH.  Order for lab placed-- will be mailed to patient.     Dr. Andres, would you like to increase Crestor or add any other medications? -ejb      ----- Message -----  From: Fawad Andres MD  Sent: 2/20/2020   9:47 AM CST  To: Shantel Cabezas RN    Await TSH

## 2021-06-06 NOTE — TELEPHONE ENCOUNTER
----- Message from Fawad Andres MD sent at 3/6/2020 11:24 AM CST -----  Shantel,TSH is normal.  We should repeat his lipid panel 4 to 5 months after he makes dietary changes.Thanks,Fawad        Called patient. Left detailed message on identified voicemail with results/recommendations.   Order placed for repeat Lipids and mailed to patient.   Left direct contact information for patient to call with questions/concerns. -yair

## 2021-06-09 NOTE — PROGRESS NOTES
Assessment/Plan:        Diagnoses and all orders for this visit:    Chronic cough  -     predniSONE (DELTASONE) 20 MG tablet; Take 1 tablet (20 mg total) by mouth daily for 10 days.  Dispense: 10 tablet; Refill: 0  -     fluticasone furoate (ARNUITY ELLIPTA) 200 mcg/actuation DsDv; Inhale 200 mcg daily.  Dispense: 2 each; Refill: 0    Post-viral cough syndrome  -     predniSONE (DELTASONE) 20 MG tablet; Take 1 tablet (20 mg total) by mouth daily for 10 days.  Dispense: 10 tablet; Refill: 0  -     fluticasone furoate (ARNUITY ELLIPTA) 200 mcg/actuation DsDv; Inhale 200 mcg daily.  Dispense: 2 each; Refill: 0    Upper airway cough syndrome  -     predniSONE (DELTASONE) 20 MG tablet; Take 1 tablet (20 mg total) by mouth daily for 10 days.  Dispense: 10 tablet; Refill: 0  -     fluticasone furoate (ARNUITY ELLIPTA) 200 mcg/actuation DsDv; Inhale 200 mcg daily.  Dispense: 2 each; Refill: 0    Chronic cough in the adult is often caused by asthma type issue related to airway irritation, post nasal drip, or reflux.  For many it is a combination.    Airways  Prednisone 20 mg daily for 10 days  Inhaler - once daily  - I will give you 2 sample inhalers and you will do this for 1 month.    Reflux  -take your prilosec once daily 1 month.    Post nasal drip  Any store brand loratadine or cetirizine once daily.  Alternatively you could use 1 benadryl at night.    We will treat will all of these meds.  Often we can get some improvement.  Typically chronic cough in the adult will resolve over time.        Subjective:    Patient ID: Buddy Branch is a 66 y.o. male.    HPI Comments: 66M here with cough.  Started in September after going to [place].  It is slowly worsening.  Cold air will make it worse.  Laughing makes it worse.  Nothing seems to help.  Localizes to chest.  Productive cough.  Thick, yellowish.  No hemoptysis, no GERD, no sinus issues although a little bit plugged. No history of sleep apnea.  History of very mild  psoriasis.    [place]    Doxy and pred 12/21  Flovent Manolo  Respiclick Feb    Cough   Associated symptoms include congestion and coughing.           Review of Systems   Constitutional: Negative.    HENT: Positive for congestion, rhinorrhea, sneezing and tinnitus.    Eyes: Negative.    Respiratory: Positive for cough, shortness of breath and wheezing.    Cardiovascular: Negative.    Gastrointestinal: Negative.    Endocrine: Negative.    Genitourinary: Negative.    Musculoskeletal: Negative.    Skin: Negative.    Allergic/Immunologic: Negative.    Neurological: Positive for dizziness.   Hematological: Negative.    Psychiatric/Behavioral: Negative.              Objective:    Physical Exam   Constitutional: He is oriented to person, place, and time. He appears well-developed and well-nourished. No distress.   HENT:   Head: Normocephalic.   Right Ear: External ear normal.   Nose: Nose normal.   Mouth/Throat: Oropharynx is clear and moist. No oropharyngeal exudate.   Normal TM and external canal bilaterally   Eyes: Pupils are equal, round, and reactive to light. Right eye exhibits no discharge. Left eye exhibits no discharge. No scleral icterus.   Neck: Normal range of motion. No JVD present. No tracheal deviation present. No thyromegaly present.   Cardiovascular: Normal rate and regular rhythm.  Exam reveals no gallop and no friction rub.    No murmur heard.  Pulmonary/Chest: Effort normal and breath sounds normal. No stridor. No respiratory distress. He has no wheezes. He has no rales.   Abdominal: Soft. Bowel sounds are normal. He exhibits no distension. There is no tenderness.   Musculoskeletal: He exhibits no edema or tenderness.   Lymphadenopathy:     He has no cervical adenopathy.   Neurological: He is alert and oriented to person, place, and time. No cranial nerve deficit.   Skin: Skin is warm and dry. No rash noted. He is not diaphoretic. No erythema. No pallor.   Psychiatric: He has a  "normal mood and affect. His behavior is normal. Judgment and thought content normal.           Current Outpatient Prescriptions on File Prior to Visit   Medication Sig Dispense Refill     aspirin 81 MG EC tablet Take 81 mg by mouth daily.       colchicine 0.6 mg tablet Take 0.6 mg by mouth daily.       ezetimibe (ZETIA) 10 mg tablet Take 10 mg by mouth daily.       isosorbide dinitrate (ISORDIL) 30 MG tablet Take 30 mg by mouth 4 (four) times a day.       lisinopril (PRINIVIL,ZESTRIL) 5 MG tablet Take 5 mg by mouth daily.       metoprolol (TOPROL-XL) 50 MG 24 hr tablet Take 50 mg by mouth daily.       nitroglycerin (NITROSTAT) 0.4 MG SL tablet Place 0.4 mg under the tongue every 5 (five) minutes as needed for chest pain.       omeprazole (PRILOSEC) 20 MG capsule Take 20 mg by mouth as needed (usually takes about 1x week).        pravastatin (PRAVACHOL) 80 MG tablet Take 80 mg by mouth bedtime.       triamcinolone (KENALOG) 0.1 % cream Apply topically 2 (two) times a day.       No current facility-administered medications on file prior to visit.      Visit Vitals     /76     Pulse 64     Resp 20     Ht 5' 8\" (1.727 m)     Wt 190 lb 4.8 oz (86.3 kg)     SpO2 97%  Comment: RA     BMI 28.94 kg/m2       Medical History  Active Ambulatory (Non-Hospital) Problems    Diagnosis     Hyperlipidemia     Hypertension     Coronary Artery Disease     Past Medical History:   Diagnosis Date     Coronary artery disease      Hyperlipidemia      MI (myocardial infarction)         Surgical History  He  has a past surgical history that includes Coronary angioplasty and Cardiac catheterization.       Social History  Reviewed, and he  reports that he has never smoked. He does not have any smokeless tobacco history on file.    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   Allergies  No Known Allergies Family History  Reviewed, and family history includes Heart attack in his mother.                            Data " Review - imaging, labs, and ekgs listed below were reviewed by me.  Chest XRay and chest CT images and EKG tracings interpreted personally.     Past Labs  Appointment on 03/13/2017   Component Date Value     Hgb 03/13/2017 11.8    Lab Requisition on 01/31/2017   Component Date Value     Specimen Source 01/31/2017 Nasopharyngeal swab      Bordetella pertussis PCR 01/31/2017 Negative      Bordetella parapertussis* 01/31/2017 Negative        PFT raw data and tracings reviewed by me and summarized for patient  Spirometry normal. Lung volumes within normal range, although RV/TLC ratio slightly increased, supranormal DLCO.  No obstruction or restriction, suggestion of air trapping, normal diffusion.    Outside records requested and reviewed/summarized here.  66M without history of COPD evaluated for cough multiple times.  Therapeutic trials with respiclick and flovent were not helpful.   CT scan chest: no infiltrates or nodules  Labs BMP Na 136, K 4.6, BUN 19, Fr 0.95    CD films requested by me.

## 2021-06-09 NOTE — PROGRESS NOTES
Patient instructed in use of Arnuity ellipta inhaler. Patient able to use the ellipta device without any difficulty.  Return demo and first dose given in clinic.  Samples given.  Phone numbers given to call if any questions.

## 2021-06-10 NOTE — TELEPHONE ENCOUNTER
----- Message -----  From: Fawad Andres MD  Sent: 8/14/2020  11:39 AM CDT  To: Shantel Cabezas RN    Could he get an ECG at the time of my visit? Come in a little early for ECG?    Fawad Gold    --------------------------------------------------------    Order placed. Patient called and asked to come in early-- to be completed upon rooming. -yair

## 2021-06-10 NOTE — PROGRESS NOTES
"Assessment/Plan:        Diagnoses and all orders for this visit:    Upper airway cough syndrome  -     beclomethasone (QVAR) 80 mcg/actuation inhaler; Inhale 1 puff 2 (two) times a day.  Dispense: 1 Inhaler; Refill: 12  -     predniSONE (DELTASONE) 20 MG tablet; Take 1 tablet (20 mg total) by mouth daily for 10 days.  Dispense: 7 tablet; Refill: 0    Palpitations    Post-viral cough syndrome     multifactorial chronic cough and 66-year-old man.  Responded to prednisone and steroid inhaler previously.  Symptoms recurred after stopping therapy.    Medication Plan  Prednisone 1 tablet daily for 7 days - this is just to \"jump start\" your treatment response, we will not keep you on this long term.    Qvar (steroid inhaler) 2 puffs twice daily.      -if you are feeling ok after a week or two off of prednisone, decrease 1 puff twice daily    -rinse your mouth out or brush your teeth after.    For a non-drowsy version of benadryl you can try any store brand loratadine or cetirizine once daily.  This shouldn't have any heart rhythm effects.        Subjective:    Patient ID: Buddy Branch is a 66 y.o. male.    HPI Comments: Post nasal drip -this has persisted.  It improved with prednisone and with steroid inhaler.  Symptoms slowly recurred after stopping prednisone and significantly recurred after stopping inhaler.  Symptoms localized to the back of the throat.  Pertinent positives include congestion.  Pertinent negatives include no nosebleeds, no hemoptysis.  Nothing seems to make his cough worse-no clear exposures.    Cough - was all the way better.  Was really helped by the prednisone.  Then recurred.  Followed a similar worse to postnasal drip.    Palpitations-came into ER with heartbeat issues.  Sometimes he is actually improved with coughing.  Sometimes they are worse with coughing.  There are felt to be PVCs by his report.  He is being evaluated by cardiology.  He is having a stress test soon.    --- from " previous  66M here with cough.  Started in September after going to [place].  It is slowly worsening.  Cold air will make it worse.  Laughing makes it worse.  Nothing seems to help.  Localizes to chest.  Productive cough.  Thick, yellowish.  No hemoptysis, no GERD, no sinus issues although a little bit plugged. No history of sleep apnea.  History of very mild psoriasis.    Lots of tobacco use in the [place].    Doxy and pred 12/21  Flovent Manolo  Respiclick Feb    Cough   Associated symptoms include congestion and coughing.           Review of Systems   Constitutional: Negative.    HENT: Positive for congestion, rhinorrhea, sneezing and tinnitus.    Eyes: Negative.    Respiratory: Positive for cough, shortness of breath and wheezing.    Cardiovascular: Negative.    Gastrointestinal: Negative.    Endocrine: Negative.    Genitourinary: Negative.    Musculoskeletal: Negative.    Skin: Negative.    Allergic/Immunologic: Negative.    Neurological: Negative.    Hematological: Negative.    Psychiatric/Behavioral: Negative.              Objective:    Physical Exam   Constitutional: He is oriented to person, place, and time. He appears well-developed and well-nourished. No distress.   HENT:   Head: Normocephalic.   Right Ear: External ear normal.   Nose: Nose normal.   Mouth/Throat: Oropharynx is clear and moist. No oropharyngeal exudate.   Eyes: Pupils are equal, round, and reactive to light. Right eye exhibits no discharge. Left eye exhibits no discharge. No scleral icterus.   Neck: Normal range of motion. No JVD present. No tracheal deviation present. No thyromegaly present.   Cardiovascular: Normal rate and regular rhythm.  Exam reveals no gallop and no friction rub.    No murmur heard.  Pulmonary/Chest: Effort normal and breath sounds normal. No stridor. No respiratory distress. He has no wheezes. He has no rales.   Musculoskeletal: He exhibits no edema or tenderness.   Lymphadenopathy:     He has no cervical adenopathy.    Neurological: He is alert and oriented to person, place, and time. No cranial nerve deficit.   Skin: Skin is warm and dry. No rash noted. He is not diaphoretic. No erythema. No pallor.   Psychiatric: He has a normal mood and affect. His behavior is normal. Judgment and thought content normal.           Current Outpatient Prescriptions on File Prior to Visit   Medication Sig Dispense Refill     aspirin 81 MG EC tablet Take 81 mg by mouth daily.       colchicine 0.6 mg tablet Take 0.6 mg by mouth daily.       ezetimibe (ZETIA) 10 mg tablet Take 10 mg by mouth daily.       fluticasone furoate (ARNUITY ELLIPTA) 200 mcg/actuation DsDv Inhale 200 mcg daily. 2 each 0     isosorbide mononitrate (IMDUR) 60 MG 24 hr tablet Take 1 tablet (60 mg total) by mouth daily. 30 tablet 6     lisinopril (PRINIVIL,ZESTRIL) 5 MG tablet Take 5 mg by mouth daily.       metoprolol (TOPROL-XL) 50 MG 24 hr tablet Take 50 mg by mouth daily.       nitroglycerin (NITROSTAT) 0.4 MG SL tablet Place 0.4 mg under the tongue every 5 (five) minutes as needed for chest pain.       omeprazole (PRILOSEC) 20 MG capsule Take 20 mg by mouth as needed (usually takes about 1x week).        pravastatin (PRAVACHOL) 80 MG tablet Take 80 mg by mouth bedtime.       triamcinolone (KENALOG) 0.1 % cream Apply topically 2 (two) times a day.       No current facility-administered medications on file prior to visit.      /76  Pulse 64  Resp 15  Wt 190 lb (86.2 kg)  SpO2 99% Comment: RA  BMI 28.06 kg/m2    Medical History  Active Ambulatory (Non-Hospital) Problems    Diagnosis     Mixed hyperlipidemia     Hypertension     Coronary Artery Disease     Past Medical History:   Diagnosis Date     Coronary artery disease      Hyperlipidemia      MI (myocardial infarction)         Surgical History  He  has a past surgical history that includes Coronary angioplasty and Cardiac catheterization.       Social History  Reviewed, and he  reports that he has never smoked.  He does not have any smokeless tobacco history on file.    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  Chest XRay and chest CT images and EKG tracings interpreted personally.     Past Labs  Admission on 04/28/2017, Discharged on 04/28/2017   Component Date Value     VENTRICULAR RATE 04/28/2017 72      ATRIAL RATE 04/28/2017 72      P-R INTERVAL 04/28/2017 178      QRS DURATION 04/28/2017 96      Q-T INTERVAL 04/28/2017 404      QTC CALCULATION (BEZET) 04/28/2017 442      P Axis 04/28/2017 41      R AXIS 04/28/2017 13      T AXIS 04/28/2017 22      MUSE DIAGNOSIS 04/28/2017                      Value:Normal sinus rhythm  Possible wandering atrial pacemaker Inferior infarct , age undetermined  Abnormal ECG  No previous ECGs available  Confirmed by CHERYL HANSON MD LOC: ([id number]) on 4/28/2017 1:40:04 PM       Sodium 04/28/2017 138      Potassium 04/28/2017 4.2      Chloride 04/28/2017 105      CO2 04/28/2017 21*     Anion Gap, Calculation 04/28/2017 12      Glucose 04/28/2017 98      Calcium 04/28/2017 9.6      BUN 04/28/2017 18      Creatinine 04/28/2017 0.87      GFR MDRD Af Amer 04/28/2017 >60      GFR MDRD Non Af Amer 04/28/2017 >60      TSH 04/28/2017 1.76      Troponin I 04/28/2017 <0.01      Magnesium 04/28/2017 1.8      INR 04/28/2017 0.92      WBC 04/28/2017 7.3      RBC 04/28/2017 5.19      Hemoglobin 04/28/2017 16.8      Hematocrit 04/28/2017 47.9      MCV 04/28/2017 92      MCH 04/28/2017 32.4      MCHC 04/28/2017 35.1      RDW 04/28/2017 11.9      Platelets 04/28/2017 177      MPV 04/28/2017 10.0      Neutrophils % 04/28/2017 63      Lymphocytes % 04/28/2017 20      Monocytes % 04/28/2017 12*     Eosinophils % 04/28/2017 5      Basophils % 04/28/2017 0      Neutrophils Absolute 04/28/2017 4.6      Lymphocytes Absolute 04/28/2017 1.4      Monocytes Absolute 04/28/2017 0.9       Eosinophils Absolute 04/28/2017 0.4      Basophils Absolute 04/28/2017 0.0    Appointment on 03/13/2017   Component Date Value     Hgb 03/13/2017 11.8        >25 minutes spent >50% counseling and coordination of care.

## 2021-06-10 NOTE — TELEPHONE ENCOUNTER
Wellness Screening Tool  Symptom Screening:  Do you have one of the following NEW symptoms:    Fever (subjective or >100.0)?  No    A new cough?  No    Shortness of breath?  No     Chills? No     New loss of taste or smell? No     Generalized body aches? No     New persistent headache? No     New sore throat? No     Nausea, vomiting, or diarrhea?  No    Within the past 3 weeks, have you been exposed to someone with a known positive illness below:    COVID-19 (known or suspected)?  No    Chicken pox?  No    Mealses?  No    Pertussis?  No    Patient notified of visitor policy- They may have one person accompany them to their appointment, but they will need to wear a mask and will be screened upon arrival for symptoms: No  Pt informed to wear a mask: Yes  Pt notified if they develop any symptoms listed above, prior to their appointment, they are to call the clinic directly at [phone] for further instructions.  Yes  Patient's appointment status: Patient will be seen in clinic as scheduled on 8/20/20. -yair

## 2021-06-10 NOTE — TELEPHONE ENCOUNTER
"Dr. Andres, please review update from patient. We scheduled him to see you next week on 8/20. Do you have any other recs in the meantime? -yair  --------------------------------------------------------    Called patient and discussed concerns. He reports that he has been experiencing increased \"heart arrhythmia\" over the last month-- worse in the last 3-4 days. When asked  what he meant by \"heart arrhythmia\", patient describes palpitations. He notes when he has an episode, it really \"wipes\" him out. Patient denies any pain, shortness of breath, dizziness or lightheadedness, just fatigue. He states that the episodes of palpitations occur almost always at rest and not when he is active. Heart rates reported to be in the 70's. Patient confirms he is taking Metoprolol Succinate 100 mg daily.    Patient recently completed chemotherapy and will begin radiation treatment on 8/24/20.  -yair      ----- Message from July Little sent at 8/14/2020  9:08 AM CDT -----      Caller: Patient  Primary cardiologist: Dr. Andres    Detailed reason for call: Patient stated that he has occasional heart arrhythmia but in the last few days it has been becoming worse. Please advise      Best phone number: [phone]  Best time to contact: today  Ok to leave a detailed message? yes  Device? No             "

## 2021-06-15 NOTE — PROGRESS NOTES
Review of Systems - Respiratory ROS: positive for - shortness of breath  Cardiovascular ROS: positive for - shortness of breath  ALL OTHERS WNL    NO OTHER CONCERNS    VIDEO VISIT    Lyn Allen, CMA

## 2021-06-15 NOTE — TELEPHONE ENCOUNTER
Return call to patient. He notes that he has been having progressively worsening symptoms of shortness of breath over the last several months.Last office visit with Dr. Andres note (8/2020) indicates that stress testing may be considered if patient does not have improvement in stamina. Patient would like video visit with Dr. Andres to discuss. Was able to transfer patient to scheduling to get scheduled for tomorrow. -ejb    ----- Message from MARIA VICTORIA Del Toro sent at 2/8/2021  2:53 PM CST -----  Regarding: [facility] PATIENT  General phone call:    Caller: Patient    Primary cardiologist: ORIN    Detailed reason for call: patient calling shortness of breath lately, would like a call back to advise what to do.     Best phone number: [phone]    Best time to contact: any    Ok to leave a detailed message? Yes    Device? no    Additional Info:

## 2021-06-16 PROBLEM — I25.10 ATHEROSCLEROSIS OF NATIVE CORONARY ARTERY OF NATIVE HEART WITHOUT ANGINA PECTORIS: Status: ACTIVE | Noted: 2019-08-26

## 2021-06-16 PROBLEM — I49.3 FREQUENT PVCS: Status: ACTIVE | Noted: 2017-07-14

## 2021-06-16 PROBLEM — R06.09 DYSPNEA ON EXERTION: Status: ACTIVE | Noted: 2021-02-09

## 2021-06-16 PROBLEM — A41.9 SEPSIS (H): Status: ACTIVE | Noted: 2019-09-28

## 2021-06-21 NOTE — LETTER
Letter by Fawad Andres MD at      Author: Fawad Andres MD Service: -- Author Type: --    Filed:  Encounter Date: 2/12/2021 Status: (Other)         Buddy Branch  [id number] [facility] [address]      February 12, 2021      Dear Buddy,    This letter is to remind you that you will be due for your one month follow up appointment with Dr. Fawad Andres around March 9, 2021 . To help ensure you are in the best health possible, a regular follow-up with your cardiologist is essential.     Please call our Patient Scheduling Line at [phone] to schedule your appointment at your earliest convenience.  If you have recently scheduled an appointment, please disregard this letter.    We look forward to seeing you again. As always, we are available at the number  above for any questions or concerns you may have.      Sincerely,     The Physicians and Staff of [facility]

## 2021-06-21 NOTE — LETTER
Letter by Fawad Andres MD at      Author: Fawad Andres MD Service: -- Author Type: --    Filed:  Encounter Date: 2/12/2021 Status: (Other)         Buddy Branch  [id number] [facility] [address]      February 12, 2021      Dear Buddy,    This letter is to remind you that you will be due for your follow up appointment with Dr. Fawad Andres around March 9, 2021 . To help ensure you are in the best health possible, a regular follow-up with your cardiologist is essential.     Please call our Patient Scheduling Line at [phone] to schedule your appointment at your earliest convenience.  If you have recently scheduled an appointment, please disregard this letter.    We look forward to seeing you again. As always, we are available at the number  above for any questions or concerns you may have.      Sincerely,     The Physicians and Staff of [facility]

## 2021-06-25 NOTE — PROGRESS NOTES
Progress Notes by Shan Avina MD at 4/25/2017  9:20 AM     Author: Shan Avina MD Service: -- Author Type: Physician    Filed: 4/25/2017 11:07 AM Encounter Date: 4/25/2017 Status: Signed    : Shan Avina MD (Physician)           Click to link to [facility] HEART CARE NOTE    Thank you, Dr. Alexandre, for asking me to see Buddyyue Branch in consultation at [facility] to evaluate CAD and chest pain.      Assessment/Plan:   1.  Coronary artery disease, multiple vessel, exertional angina: The patient has a significant medical history of coronary artery disease.  His coronary anatomy as mentioned below.  He had nuclear stress test 2 years ago which was reported a small size of inducible myocardial ischemia, preserved left ventricular wall motion and function.  The patient's symptoms most likely are caused by exertional angina.  He has palpitations and the left arm pressure/heaviness, associated with exertion, relieved by drinking water and the rest.  He did mention his symptoms are not similar to previous heart attack.  Considering his coronary anatomy with difficult standing in the past, exercise stress nuclear study is requested for risk stratification.  If the patient has a good size of inducible myocardial ischemia, worse than previous study, we will discuss coronary angiogram with possible PCI.  Otherwise may continue medical treatment.  Increased isosorbide mononitrate from 30 mg to 60 mg daily for possible exertional angina.  Continue aspirin, metoprolol XL 50 mg daily.    2.  Essential hypertension: His blood pressure is controlled with current medications.    3.  Dyslipidemia: Continue Zetia and pravastatin.  The patient could not tolerate other statins.  Did mention PCSK9 inhibitor to him. His last LDL was 89, acceptable.    Will follow up his exercise nuclear stress test.  The patient will see his primary cardiologist as scheduled.    Thank you for the  opportunity to be involved in the care of Buddy Branch. If you have any questions, please feel free to contact me.       Much or all of the text in this note was generated through the use of Dragon Dictate voice-to-text software. Errors in spelling or words which seem out of context are unintentional.   Sound alike errors, in particular, may have escaped editing.       History of Present Illness:   It is my pleasure to see Buddy Branch at the [facility] clinic for evaluation of Follow-up. Buddy Branch is a 66 y.o. male with a medical history of coronary artery disease, essential hypertension and hyperlipidemia.    His coronary angiogram in 2006 was reported a 60% stenosis in his 1st diagonal at that time, 40% in the first obtuse marginal, and 70% in the distal circumflex. The LAD was normal in appearance and free of obstructive disease. He underwent angioplasty of the distal right coronary artery, which was complicated by distal embolization of the posterolateral artery. He had a very tortuous right coronary artery and a thrombectomy catheter or stent could not be placed. Medical therapy was recommended.     The patient has done fine until 2 weeks ago.  The patient developed intermittent irregular heartbeat with lightheadedness, left arm pressure and heaviness.  His symptoms were typically associated with activities and exertion, lasted about 10-15 minutes, lasted about after 1 hour at initial episode 2 weeks ago.  His symptoms with relieved by drinking water and rest.  He has no shortness of breath, syncope, orthopnea, PND or leg swelling.  His blood pressure and heart rate in normal range.  The patient clearly states that his symptoms are not similar to that prior to previous heart attack in 2006.    Past Medical History:     Patient Active Problem List   Diagnosis   ? Hyperlipidemia   ? Hypertension   ? Coronary Artery Disease       Past Surgical History:     Past Surgical History:   Procedure  "Laterality Date   ? CARDIAC CATHETERIZATION     ? CORONARY ANGIOPLASTY         Family History:     Family History   Problem Relation Age of Onset   ? Heart attack Mother        Social History:    reports that he has never smoked. He does not have any smokeless tobacco history on file.    Review of Systems:   General: WNL  Eyes: WNL  Ears/Nose/Throat: WNL  Lungs: WNL  Heart: Arm Pain, Irregular Heartbeat  Stomach: WNL  Bladder: WNL  Muscle/Joints: WNL  Skin: WNL  Nervous System: WNL  Mental Health: WNL     Blood: WNL    Meds:     Current Outpatient Prescriptions:   ?  aspirin 81 MG EC tablet, Take 81 mg by mouth daily., Disp: , Rfl:   ?  colchicine 0.6 mg tablet, Take 0.6 mg by mouth daily., Disp: , Rfl:   ?  ezetimibe (ZETIA) 10 mg tablet, Take 10 mg by mouth daily., Disp: , Rfl:   ?  lisinopril (PRINIVIL,ZESTRIL) 5 MG tablet, Take 5 mg by mouth daily., Disp: , Rfl:   ?  metoprolol (TOPROL-XL) 50 MG 24 hr tablet, Take 50 mg by mouth daily., Disp: , Rfl:   ?  nitroglycerin (NITROSTAT) 0.4 MG SL tablet, Place 0.4 mg under the tongue every 5 (five) minutes as needed for chest pain., Disp: , Rfl:   ?  omeprazole (PRILOSEC) 20 MG capsule, Take 20 mg by mouth as needed (usually takes about 1x week). , Disp: , Rfl:   ?  pravastatin (PRAVACHOL) 80 MG tablet, Take 80 mg by mouth bedtime., Disp: , Rfl:   ?  triamcinolone (KENALOG) 0.1 % cream, Apply topically 2 (two) times a day., Disp: , Rfl:   ?  fluticasone furoate (ARNUITY ELLIPTA) 200 mcg/actuation DsDv, Inhale 200 mcg daily., Disp: 2 each, Rfl: 0  ?  isosorbide mononitrate (IMDUR) 60 MG 24 hr tablet, Take 1 tablet (60 mg total) by mouth daily., Disp: 30 tablet, Rfl: 6     Allergies:   Review of patient's allergies indicates no known allergies.    Objective:      Physical Exam  190 lb (86.2 kg)  5' 8\" (1.727 m)  Body mass index is 28.89 kg/(m^2).  /78 (Patient Site: Left Arm, Patient Position: Sitting, Cuff Size: Adult Large)  Pulse 84  Resp 18  Ht 5' 8\" (1.727 " m)  Wt 190 lb (86.2 kg)  BMI 28.89 kg/m2    General Appearance:   Awake, Alert, No acute distress.   HEENT:  Pupil equal, reactive to light. No scleral icterus; the mucous membranes were moist. No oral ulcers or thrush.    Neck: No cervical bruits. No JVD. No thyromegaly. No lymph node enlargement or tenderness.   Chest: The spine was straight. The chest was symmetric.   Lungs:   Respirations unlabored. Lungs are clear to auscultation. No crackles. No wheezing.   Cardiovascular:   RRR, normal first and second heart sounds with no murmurs. No rubs or gallops.    Abdomen:  Soft. No tenderness. Non-distended. Bowels sounds are present   Extremities: Equal posterior tibial pulses. No leg edema.   Skin: No rashes or ulcers. Warm, Dry.   Musculoskeletal: No tenderness. No deformity.   Neurologic: Mood and affect are appropriate. No focal deficits.         Cardiac Imaging Studies:  Nuclear stress test on 11-5-2014:  CONCLUSIONS:  1. The patient achieved a good exercise workload without inducible angina.  2 Exercise stress ECG is negative per Dr. Christine Damon.  3. Exercise stress nuclear study is abnormal. There is a very small area of  myocardial ischemia in the mid anterior wall.  4. Normal left ventricular size and function. Normal wall motion. The quantitated  left ventricular ejection fraction is 64%.     COMMENTS: Based on the nuclear stress test findings, the patient has a low risk of  cardiac events in the near future.    Lab Review   Lab Results   Component Value Date     01/04/2017    K 4.6 01/04/2017     01/04/2017    CO2 24 01/04/2017    BUN 19 01/04/2017    CREATININE 0.95 01/04/2017    CALCIUM 9.4 01/04/2017     Lab Results   Component Value Date    CHOL 189 04/26/2016    TRIG 239 (H) 04/26/2016    HDL 52 04/26/2016    LDLDIRECT 62 11/03/2015

## 2021-06-25 NOTE — PROGRESS NOTES
Progress Notes by Fawad Andres MD at 7/14/2017 11:10 AM     Author: Fawad Andres MD Service: -- Author Type: Physician    Filed: 7/14/2017 11:50 AM Encounter Date: 7/14/2017 Status: Signed    : Fawad Andres MD (Physician)           Click to link to [facility] HEART CARE NOTE    Thank you, Dr. Alexandre, for asking us to see Buddy Branch at the [facility].      Assessment/Recommendations   Patient with known coronary artery disease, frequent PVCs on recent Holter but no worrisome rhythm disturbances.  He has normal left ventricular ejection fraction and no ischemia and his electrolytes are normal.  I have increased his Toprol to 75 mg a day and he will call us in 2-3 weeks to see if this helps the palpitations.  I tried to reassure him that they are benign based on no myocardial ischemia and normal left ventricular systolic function.    I have also asked him to stop his lisinopril and this should help the cough.  He will call us in 2-3 weeks to tell us if his PVC symptoms are improved and to let us know what his blood pressures are.  I have asked him to check his blood pressure 2-3 times each week.    Thank you for allowing us to participate in his care.         History of Present Illness    Mr. Buddy Branch is a 66 y.o. male with known hypertension and coronary artery disease.  He has a history of percutaneous intervention.  Earlier this year he was noticing more palpitations and thumping of his heart.  This would occur when he is at rest and continues to occur.  He would not occur when he is physically active.  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  He is constantly doing some kind of a project and when he is busy he does not have these symptoms.  He had a nuclear exercise test which did not show evidence of ischemia and he had a left ventricular ejection fraction of 53%.  He had  electrolytes as well as a magnesium and a TSH all of which were normal.  He has not had any chest discomfort and also denies orthopnea, paroxysmal nocturnal dyspnea, peripheral edema, syncope or near syncopal episodes.  He will get an instantaneous lightheaded feeling when he feels the post PVC beat.    He has had a chronic cough and when he stopped his lisinopril for 10 days it went away.  He restarted it however.         Physical Examination Review of Systems   Vitals:    07/14/17 1110   BP: 126/66   Pulse: 76   Resp: 16     Body mass index is 27.45 kg/(m^2).  Wt Readings from Last 3 Encounters:   07/14/17 188 lb 9.6 oz (85.5 kg)   05/01/17 190 lb (86.2 kg)   04/28/17 180 lb (81.6 kg)     General Appearance:   Alert, cooperative and in no acute distress.   ENT/Mouth: Oral mucuos membranes pink and moist .      EYES:  No scleral icterus. No Xanthelasma.    Neck: JVP normal. No Hepatojugular reflux. Thyroid not visualized   Chest/Lungs:   Lungs are clear to auscultation, equal chest wall expansion    Cardiovascular:   S1, S2 without murmur ,clicks or rubs. Brachial, radial and posterior tibial pulses are intact and symetric. No carotid bruits noted   Abdomen:  Nontender. BS+.       Extremities: No cyanosis, clubbing or edema   Skin: no xanthelasma, warm.    Psych: Appropriate affect.   Neurologic: normal gait, normal  bilateral, no tremors        General: WNL  Eyes: WNL  Ears/Nose/Throat: WNL  Lungs: Cough  Heart: Irregular Heartbeat  Stomach: WNL  Bladder: WNL  Muscle/Joints: WNL  Skin: WNL  Nervous System: WNL  Mental Health: WNL     Blood: WNL     Medical History  Surgical History Family History Social History   Past Medical History:   Diagnosis Date   ? Coronary artery disease    ? High cholesterol    ? Hyperlipidemia    ? MI (myocardial infarction)    ? PVC (premature ventricular contraction)     Past Surgical History:   Procedure Laterality Date   ? ANGIOPLASTY     ? CARDIAC CATHETERIZATION     ? CORONARY  ANGIOPLASTY      Family History   Problem Relation Age of Onset   ? Heart attack Mother     Social History     Social History   ? Marital status:      Spouse name: N/A   ? Number of children: N/A   ? Years of education: N/A     Occupational History   ? Not on file.     Social History Main Topics   ? Smoking status: Former Smoker   ? Smokeless tobacco: Not on file   ? Alcohol use Not on file   ? Drug use: Not on file   ? Sexual activity: Not on file     Other Topics Concern   ? Not on file     Social History Narrative          Medications  Allergies   Current Outpatient Prescriptions   Medication Sig Dispense Refill   ? aspirin 81 MG EC tablet Take 81 mg by mouth daily.     ? beclomethasone (QVAR) 80 mcg/actuation inhaler Inhale 1 puff 2 (two) times a day. 1 Inhaler 12   ? colchicine 0.6 mg tablet Take 0.6 mg by mouth daily.     ? ezetimibe (ZETIA) 10 mg tablet Take 10 mg by mouth daily.     ? isosorbide mononitrate (IMDUR) 60 MG 24 hr tablet Take 1 tablet (60 mg total) by mouth daily. 30 tablet 6   ? metoprolol (TOPROL-XL) 50 MG 24 hr tablet Take 50 mg by mouth daily.     ? nitroglycerin (NITROSTAT) 0.4 MG SL tablet Place 0.4 mg under the tongue every 5 (five) minutes as needed for chest pain.     ? omeprazole (PRILOSEC) 20 MG capsule Take 20 mg by mouth as needed (usually takes about 1x week).      ? pravastatin (PRAVACHOL) 80 MG tablet Take 80 mg by mouth bedtime.     ? triamcinolone (KENALOG) 0.1 % cream Apply topically 2 (two) times a day.     ? fluticasone furoate (ARNUITY ELLIPTA) 200 mcg/actuation DsDv Inhale 200 mcg daily. 2 each 0   ? metoprolol succinate (TOPROL-XL) 50 MG 24 hr tablet Take 1.5 tablets (75 mg total) by mouth daily. 135 tablet 3     No current facility-administered medications for this visit.       No Known Allergies      Lab Results    Chemistry/lipid CBC Cardiac Enzymes/BNP/TSH/INR   Lab Results   Component Value Date    CHOL 189 04/26/2016    HDL 52 04/26/2016    LDLCALC 89  04/26/2016    TRIG 239 (H) 04/26/2016    CREATININE 0.87 04/28/2017    BUN 18 04/28/2017    K 4.2 04/28/2017     04/28/2017     04/28/2017    CO2 21 (L) 04/28/2017    Lab Results   Component Value Date    WBC 7.3 04/28/2017    HGB 16.8 04/28/2017    HCT 47.9 04/28/2017    MCV 92 04/28/2017     04/28/2017    Lab Results   Component Value Date    TROPONINI <0.01 04/28/2017    TSH 1.76 04/28/2017    INR 0.92 04/28/2017

## 2021-06-26 ENCOUNTER — HEALTH MAINTENANCE LETTER (OUTPATIENT)
Age: 71
End: 2021-06-26

## 2021-06-26 NOTE — PROGRESS NOTES
Progress Notes by Fawad Andres MD at 8/28/2018 12:50 PM     Author: Fawad Andres MD Service: -- Author Type: Physician    Filed: 8/28/2018  1:11 PM Encounter Date: 8/28/2018 Status: Signed    : Fawad Andres MD (Physician)           Click to link to [facility] HEART CARE NOTE    Thank you, Dr. Knott, for asking us to see Buddy Branch at the [facility].      Assessment/Recommendations   Patient with known coronary artery disease and hypertension.  His blood pressure is at goal and he tells me his blood pressure at home is even lower than in the office today.  He is active and does not have any changes in his functional capacity.  He takes an antiplatelet agent.  He has not had a recent lipid panel have asked him to stop it his primary care clinic and get a fasting lipid panel in the next couple weeks which she will do.    I would like to see him back in 1 year, but of course would be happy to see him sooner if questions or problems arise.    Thank you for allowing us to participate in his care.         History of Present Illness    Mr. Buddy Branch is a 67 y.o. male with known coronary artery disease with a distant history of percutaneous intervention.  About a year ago he was having frequent PVCs.  He did not have evidence of myocardial ischemia and had normal left ventricular systolic function.  We did increase his beta-blocker and the palpitations and PVCs seem to go away.  He has been feeling very well and is been quite active.  He has not had any chest discomfort, orthopnea, paroxysmal nocturnal dyspnea, peripheral edema, syncope or near syncopal episodes.  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  In the wintertime he walks outside or on a treadmill.         Physical Examination Review of Systems   Vitals:    08/28/18 1251   BP: 136/82    Pulse: 76   Resp: 16     Body mass index is 27.76 kg/(m^2).  Wt Readings from Last 3 Encounters:   08/28/18 188 lb (85.3 kg)   07/14/17 188 lb 9.6 oz (85.5 kg)   05/01/17 190 lb (86.2 kg)     General Appearance:   Alert, cooperative and in no acute distress.   ENT/Mouth: Oral mucuos membranes pink and moist .      EYES:  No scleral icterus. No Xanthelasma.    Neck: JVP normal. No Hepatojugular reflux. Thyroid not visualized   Chest/Lungs:   Lungs are clear to auscultation, equal chest wall expansion    Cardiovascular:   S1, S2 without murmur ,clicks or rubs. Brachial, radial and posterior tibial pulses are intact and symetric. No carotid bruits noted   Abdomen:  Nontender. BS+.       Extremities: No cyanosis, clubbing or edema   Skin: no xanthelasma, warm.    Psych: Appropriate affect.   Neurologic: normal gait, normal  bilateral, no tremors        General: WNL  Eyes: WNL  Ears/Nose/Throat: WNL  Lungs: WNL  Heart: WNL  Stomach: WNL  Bladder: WNL  Muscle/Joints: WNL  Skin: WNL  Nervous System: WNL  Mental Health: WNL     Blood: WNL     Medical History  Surgical History Family History Social History   Past Medical History:   Diagnosis Date   ? Coronary artery disease    ? High cholesterol    ? Hyperlipidemia    ? MI (myocardial infarction)    ? PVC (premature ventricular contraction)     Past Surgical History:   Procedure Laterality Date   ? ANGIOPLASTY     ? CARDIAC CATHETERIZATION     ? CORONARY ANGIOPLASTY      Family History   Problem Relation Age of Onset   ? Heart attack Mother     Social History     Social History   ? Marital status:      Spouse name: N/A   ? Number of children: N/A   ? Years of education: N/A     Occupational History   ? Not on file.     Social History Main Topics   ? Smoking status: Former Smoker   ? Smokeless tobacco: Never Used   ? Alcohol use Not on file   ? Drug use: Not on file   ? Sexual activity: Not on file     Other Topics Concern   ? Not on file     Social History  Narrative          Medications  Allergies   Current Outpatient Prescriptions   Medication Sig Dispense Refill   ? aspirin 81 MG EC tablet Take 81 mg by mouth daily.     ? colchicine 0.6 mg tablet Take 0.6 mg by mouth daily.     ? ezetimibe (ZETIA) 10 mg tablet Take 10 mg by mouth daily.     ? isosorbide mononitrate (IMDUR) 60 MG 24 hr tablet TAKE ONE TABLET BY MOUTH DAILY, DOSE INCREASE 90 tablet 1   ? losartan (COZAAR) 25 MG tablet Take 1 tablet (25 mg total) by mouth daily. 30 tablet 11   ? metoprolol succinate (TOPROL-XL) 50 MG 24 hr tablet Take 1.5 tablets (75 mg total) by mouth daily. (Patient taking differently: Take 50 mg by mouth daily. ) 135 tablet 3   ? nitroglycerin (NITROSTAT) 0.4 MG SL tablet Place 0.4 mg under the tongue every 5 (five) minutes as needed for chest pain.     ? omeprazole (PRILOSEC) 20 MG capsule Take 20 mg by mouth as needed (usually takes about 1x week).      ? pravastatin (PRAVACHOL) 80 MG tablet Take 80 mg by mouth bedtime.     ? triamcinolone (KENALOG) 0.1 % cream Apply topically 2 (two) times a day.     ? beclomethasone (QVAR) 80 mcg/actuation inhaler Inhale 1 puff 2 (two) times a day. 1 Inhaler 12   ? fluticasone furoate (ARNUITY ELLIPTA) 200 mcg/actuation DsDv Inhale 200 mcg daily. 2 each 0     No current facility-administered medications for this visit.       Allergies   Allergen Reactions   ? Ace Inhibitors Cough         Lab Results    Chemistry/lipid CBC Cardiac Enzymes/BNP/TSH/INR   Lab Results   Component Value Date    CHOL 189 04/26/2016    HDL 52 04/26/2016    LDLCALC 89 04/26/2016    TRIG 239 (H) 04/26/2016    CREATININE 0.87 04/28/2017    BUN 18 04/28/2017    K 4.2 04/28/2017     04/28/2017     04/28/2017    CO2 21 (L) 04/28/2017    Lab Results   Component Value Date    WBC 7.3 04/28/2017    HGB 16.8 04/28/2017    HCT 47.9 04/28/2017    MCV 92 04/28/2017     04/28/2017    Lab Results   Component Value Date    TROPONINI <0.01 04/28/2017    TSH 1.76  04/28/2017    INR 0.92 04/28/2017

## 2021-06-27 NOTE — PROGRESS NOTES
Progress Notes by Fawad Andres MD at 8/14/2019  1:10 PM     Author: Fawad Andres MD Service: -- Author Type: Physician    Filed: 8/14/2019  1:35 PM Encounter Date: 8/14/2019 Status: Signed    : Fawad Andres MD (Physician)           Click to link to [facility] HEART CARE NOTE    Thank you, Dr. Knott, for asking us to see Buddy Branch at the [facility].      Assessment/Recommendations   Patient with known coronary artery disease and frequent palpitations which are more prominent at this time.  The palpitations unnerved him and also are increase in intensity of late.  I think would be prudent to repeat a Holter monitor and also a stress test to see if his left ventricular ejection fraction is deteriorated and to look for myocardial ischemia.  He is amenable to both of these test which we will do before his surgery in 2 weeks.    Thank you for allowing us to participate in his care.         History of Present Illness    Mr. Buddy Branch is a 68 y.o. male known distant history of percutaneous intervention of his coronary arteries.  He is also had hypertension, hyperlipidemia as well as premature ventricular contractions.  In 2017 he was feeling palpitations which are bothersome.  We increased his beta-blocker, did a stress test and Holter monitor and his symptoms seem to improve.  His left ventricular ejection fraction is mildly depressed to at the lower limit of normal on the previous nuke 2 years ago.  In the last 6 months his palpitations have come back and they are more bothersome.  He believes he has some anxiety but that the PVCs makes his anxiety worse or when he gets anxious he has more PVCs.  He does not describe them as PVCs but rather palpitations and if his heart is pounding.  He has not had any syncopal or near syncopal episodes.  He denies any chest discomfort, orthopnea, paroxysmal nocturnal dyspnea, peripheral edema.  He is active  and does a lot of outside  type work and does not have any chest discomfort or shortness of breath with that.    Recent potassium was normal.    He was also recently found to have prostate cancer in 2 weeks he will be having surgery for his prostate.  This will happen at [facility].    .     Physical Examination Review of Systems   Vitals:    08/14/19 1258   BP: 156/66   Pulse: 76   Resp: 14     Body mass index is 27.62 kg/m .  Wt Readings from Last 3 Encounters:   08/14/19 187 lb (84.8 kg)   08/28/18 188 lb (85.3 kg)   07/14/17 188 lb 9.6 oz (85.5 kg)     General Appearance:   Alert, cooperative and in no acute distress.   ENT/Mouth: Oral mucuos membranes pink and moist .      EYES:  No scleral icterus. No Xanthelasma.    Neck: JVP normal normal. No Hepatojugular reflux. Thyroid not visualized   Chest/Lungs:   Lungs are clear to auscultation, equal chest wall expansion    Cardiovascular:   S1, S2 without murmur ,clicks or rubs. Brachial, radial and posterior tibial pulses are intact and symetric. No carotid bruits noted   Abdomen:  Nontender. BS+.      Extremities: No cyanosis, clubbing or edema   Skin: no xanthelasma, warm.    Psych: Appropriate affect.   Neurologic: normal gait, normal  bilateral, no tremors        General: WNL  Eyes: WNL  Ears/Nose/Throat: WNL  Lungs: WNL  Heart: Irregular Heartbeat  Stomach: WNL  Bladder: WNL  Muscle/Joints: WNL  Skin: WNL  Nervous System: WNL  Mental Health: WNL     Blood: WNL     Medical History  Surgical History Family History Social History   Past Medical History:   Diagnosis Date   ? Coronary artery disease    ? High cholesterol    ? Hyperlipidemia    ? MI (myocardial infarction) (H)    ? PVC (premature ventricular contraction)     Past Surgical History:   Procedure Laterality Date   ? ANGIOPLASTY     ? CARDIAC CATHETERIZATION     ? CORONARY ANGIOPLASTY      Family History   Problem Relation Age of Onset   ? Heart attack Mother     Social History      Socioeconomic History   ? Marital status:      Spouse name: Not on file   ? Number of children: Not on file   ? Years of education: Not on file   ? Highest education level: Not on file   Occupational History   ? Not on file   Social Needs   ? Financial resource strain: Not on file   ? Food insecurity:     Worry: Not on file     Inability: Not on file   ? Transportation needs:     Medical: Not on file     Non-medical: Not on file   Tobacco Use   ? Smoking status: Former Smoker   ? Smokeless tobacco: Never Used   Substance and Sexual Activity   ? Alcohol use: Not on file   ? Drug use: Not on file   ? Sexual activity: Not on file   Lifestyle   ? Physical activity:     Days per week: Not on file     Minutes per session: Not on file   ? Stress: Not on file   Relationships   ? Social connections:     Talks on phone: Not on file     Gets together: Not on file     Attends Latter day service: Not on file     Active member of club or organization: Not on file     Attends meetings of clubs or organizations: Not on file     Relationship status: Not on file   ? Intimate partner violence:     Fear of current or ex partner: Not on file     Emotionally abused: Not on file     Physically abused: Not on file     Forced sexual activity: Not on file   Other Topics Concern   ? Not on file   Social History Narrative   ? Not on file          Medications  Allergies   Current Outpatient Medications   Medication Sig Dispense Refill   ? aspirin 81 MG EC tablet Take 81 mg by mouth daily.     ? colchicine 0.6 mg tablet Take 0.6 mg by mouth daily.     ? ezetimibe (ZETIA) 10 mg tablet Take 10 mg by mouth daily.     ? isosorbide mononitrate (IMDUR) 60 MG 24 hr tablet TAKE ONE TABLET BY MOUTH DAILY 90 tablet 2   ? metoprolol succinate (TOPROL-XL) 50 MG 24 hr tablet TAKE 1 1/2 TABLETS (75MG TOTAL) BY MOUTH ONCE DAILY 135 tablet 1   ? omeprazole (PRILOSEC) 20 MG capsule Take 20 mg by mouth as needed (usually takes about 1x week).      ?  pravastatin (PRAVACHOL) 80 MG tablet Take 80 mg by mouth bedtime.     ? beclomethasone (QVAR) 80 mcg/actuation inhaler Inhale 1 puff 2 (two) times a day. 1 Inhaler 12   ? fluticasone furoate (ARNUITY ELLIPTA) 200 mcg/actuation DsDv Inhale 200 mcg daily. 2 each 0   ? losartan (COZAAR) 25 MG tablet TAKE 1 TABLET (25 MG TOTAL) BY MOUTH DAILY. 90 tablet 1   ? nitroglycerin (NITROSTAT) 0.4 MG SL tablet Place 0.4 mg under the tongue every 5 (five) minutes as needed for chest pain.     ? triamcinolone (KENALOG) 0.1 % cream Apply topically 2 (two) times a day.       No current facility-administered medications for this visit.       Allergies   Allergen Reactions   ? Ace Inhibitors Cough         Lab Results    Chemistry/lipid CBC Cardiac Enzymes/BNP/TSH/INR   Lab Results   Component Value Date    CHOL 189 04/26/2016    HDL 52 04/26/2016    LDLCALC 89 04/26/2016    TRIG 239 (H) 04/26/2016    CREATININE 0.87 04/28/2017    BUN 18 04/28/2017    K 4.2 04/28/2017     04/28/2017     04/28/2017    CO2 21 (L) 04/28/2017    Lab Results   Component Value Date    WBC 7.3 04/28/2017    HGB 16.8 04/28/2017    HCT 47.9 04/28/2017    MCV 92 04/28/2017     04/28/2017    Lab Results   Component Value Date    TROPONINI <0.01 04/28/2017    TSH 1.76 04/28/2017    INR 0.92 04/28/2017

## 2021-06-29 NOTE — PROGRESS NOTES
Progress Notes by Fawad Andres MD at 8/20/2020  2:10 PM     Author: Fawad Andres MD Service: -- Author Type: Physician    Filed: 8/20/2020  3:15 PM Encounter Date: 8/20/2020 Status: Signed    : Fawad Andres MD (Physician)               Assessment/Recommendations   Patient with known history of coronary artery disease, status post distant percutaneous intervention and angiogram last year which showed a moderate lesion in the left anterior descending coronary artery.  He is struggled through chemotherapy for metastatic prostate cancer and had more PVCs.  Last year we did a Holter which did show PVCs but no worrisome rhythm disturbances.    We decided to see if finishing the chemo would help with his PVCs and will give it 2 or 3 weeks.  If they do not get better we will put a monitor on him.    We will plan on seeing him back next year.  If he is stamina does not improve with getting off of the chemotherapy, would also consider repeat stress test.    He is currently taking a statin and an antiplatelet agent and his blood pressure is well controlled.    Thank you for allowing us to participate in his care       History of Present Illness/Subjective    Mr. Buddy Branch is a 69 y.o. male with known coronary artery disease with distant history of percutaneous intervention and angiogram last year which showed a moderate lesion in his left anterior descending coronary artery.  He had had a positive nuclear study.  He did have PVCs although not any worrisome rhythm disturbances on a Holter monitor last year.  His palpitations have been getting worse.  He went through chemotherapy for metastatic prostate cancer and just finished it.  Since he is finished he wonders if the PVCs are little bit better and he wonders if the chemotherapy exacerbated that.  He feels the palpitations when he is resting but never when he is up and about and busy.  He feels a little bit more winded of late but he has been very  deconditioned.  He denies orthopnea or paroxysmal nocturnal dyspnea and has not had any chest pain or peripheral edema.    ECG: Personally reviewed.  Sinus rhythm at a rate of 89 bpm and no PVCs.  There is nonspecific ST-T wave changes..       Physical Examination Review of Systems   Vitals:    08/20/20 1420   BP: 130/70   Pulse: 88   Resp: 14     Body mass index is 28.59 kg/m .  Wt Readings from Last 3 Encounters:   08/20/20 188 lb (85.3 kg)   09/28/19 177 lb 14.4 oz (80.7 kg)   08/28/19 180 lb 3.2 oz (81.7 kg)     General Appearance:   Alert, cooperative and in no acute distress.   ENT/Mouth: Oral mucuos membranes pink and moist .      EYES:  no scleral icterus, normal conjunctivae   Neck:  Wearing a bandanna so cannot examine his neck thyroid not visualized.   Chest/Lungs:   Lungs are clear to auscultation, equal chest wall expansion.   Cardiovascular:   S1, S2 without murmur ,clicks or rubs. Brachial, radial and posterior tibial pulses are intact and symetric. No carotid bruits noted   Abdomen:  Nontender. BS+.    Extremities: No cyanosis, clubbing or edema   Skin: no xanthelasma, warm.    Neurologic: normal arm movement bilaterally, no tremors     Psychiatric: Appropriate affect.      General: WNL  Eyes: WNL  Ears/Nose/Throat: WNL  Lungs: WNL  Heart: WNL  Stomach: WNL  Bladder: WNL  Muscle/Joints: WNL  Skin: WNL  Nervous System: WNL  Mental Health: WNL     Blood: WNL       Medical History  Surgical History Family History Social History   Past Medical History:   Diagnosis Date   ? Coronary artery disease    ? Hyperlipidemia    ? MI (myocardial infarction) (H)    ? PVC (premature ventricular contraction)     Past Surgical History:   Procedure Laterality Date   ? ANGIOPLASTY     ? CARDIAC CATHETERIZATION     ? CORONARY ANGIOPLASTY     ? CV CORONARY ANGIOGRAM N/A 8/28/2019    Procedure: Coronary Angiogram;  Surgeon: Odilon Roman MD;  Location: [facility] Cath Lab;  Service: Cardiology    Family History    Problem Relation Age of Onset   ? Heart attack Mother     Social History     Socioeconomic History   ? Marital status:      Spouse name: Not on file   ? Number of children: Not on file   ? Years of education: Not on file   ? Highest education level: Not on file   Occupational History     Employer: [employer]   Social Needs   ? Financial resource strain: Not on file   ? Food insecurity     Worry: Not on file     Inability: Not on file   ? Transportation needs     Medical: Not on file     Non-medical: Not on file   Tobacco Use   ? Smoking status: Former Smoker   ? Smokeless tobacco: Never Used   Substance and Sexual Activity   ? Alcohol use: Yes     Alcohol/week: 10.0 - 15.0 standard drinks     Types: 10 - 15 Cans of beer per week   ? Drug use: Never   ? Sexual activity: Not on file   Lifestyle   ? Physical activity     Days per week: Not on file     Minutes per session: Not on file   ? Stress: Not on file   Relationships   ? Social connections     Talks on phone: Not on file     Gets together: Not on file     Attends Christianity service: Not on file     Active member of club or organization: Not on file     Attends meetings of clubs or organizations: Not on file     Relationship status: Not on file   ? Intimate partner violence     Fear of current or ex partner: Not on file     Emotionally abused: Not on file     Physically abused: Not on file     Forced sexual activity: Not on file   Other Topics Concern   ? Not on file   Social History Narrative   ? Not on file          Medications  Allergies   Current Outpatient Medications   Medication Sig Dispense Refill   ? betamethasone valerate (VALISONE) 0.1 % ointment Apply 1 application topically 2 (two) times a day as needed.     ? colchicine 0.6 mg tablet Take 0.6 mg by mouth daily as needed.            ? docusate sodium (COLACE) 50 MG capsule Take by mouth 2 (two) times a day as needed for constipation.     ? HYDROcodone-acetaminophen 5-325 mg per tablet  Take 1-2 tablets by mouth every 4 (four) hours as needed (moderate or severe pain).     ? isosorbide mononitrate (IMDUR) 60 MG 24 hr tablet TAKE ONE TABLET BY MOUTH DAILY 90 tablet 2   ? losartan (COZAAR) 25 MG tablet TAKE 1 TABLET (25 MG TOTAL) BY MOUTH DAILY. 90 tablet 1   ? metoprolol succinate (TOPROL-XL) 100 MG 24 hr tablet Take 1 tablet (100 mg total) by mouth daily. 90 tablet 1   ? neomycin-bacitracin-polymyxin (NEOSPORIN) ointment Apply to penis at catheter insertion site three times a day while catheter is in place. 15 g 0   ? nitroglycerin (NITROSTAT) 0.4 MG SL tablet Place 0.4 mg under the tongue every 5 (five) minutes as needed for chest pain.     ? omeprazole (PRILOSEC) 20 MG capsule Take 20 mg by mouth as needed (usually takes about 1x week).      ? rosuvastatin (CRESTOR) 20 MG tablet TAKE 1 TABLET (20 MG TOTAL) BY MOUTH AT BEDTIME. 90 tablet 2     No current facility-administered medications for this visit.     Allergies   Allergen Reactions   ? Ace Inhibitors Cough         Lab Results    Chemistry/lipid CBC Cardiac Enzymes/BNP/TSH/INR   Lab Results   Component Value Date    CHOL 189 04/26/2016    HDL 52 04/26/2016    LDLCALC 89 04/26/2016    TRIG 239 (H) 04/26/2016    CREATININE 0.87 10/02/2019    BUN 10 10/02/2019    K 3.8 10/02/2019     10/02/2019     10/02/2019    CO2 26 10/02/2019    Lab Results   Component Value Date    WBC 6.8 10/02/2019    HGB 10.4 (L) 10/02/2019    HCT 29.5 (L) 10/02/2019    MCV 93 10/02/2019     10/02/2019    Lab Results   Component Value Date    TROPONINI <0.01 04/28/2017    TSH 1.76 04/28/2017    INR 0.92 04/28/2017

## 2021-06-30 NOTE — PROGRESS NOTES
"Progress Notes by Fawad Andres MD at 2/9/2021  4:30 PM     Author: Fawad Andres MD Service: -- Author Type: Physician    Filed: 2/9/2021  5:05 PM Encounter Date: 2/9/2021 Status: Signed    : Fawad Andres MD (Physician)           The patient has been notified of following:     \"This video visit will be conducted via a call between you and your physician/provider. We have found that certain health care needs can be provided without the need for an in-person physical exam.  This service lets us provide the care you need with a video conversation.  If a prescription is necessary we can send it directly to your pharmacy.  If lab work is needed we can place an order for that and you can then stop by our lab to have the test done at a later time.      Patient has given verbal consent to a Video visit? Yes    HEART CARE VIDEO ENCOUNTER        The patient has chosen to have the visit conducted as a video visit, to reduce risk of exposure given the current status of Coronavirus in our community. This video visit is being conducted via a call between the patient and physician/provider. Health care needs are being provided without a physical exam.     Assessment/Recommendations   Assessment/Plan:  Patient with known coronary artery disease who is undergone extensive therapy for metastatic prostate cancer now has quite significant shortness of breath.  Shortness of breath is with minimal activity and even feels somewhat short of breath at rest.  He has been working with his oncology team at the [facility] and they have gotten him involved with palliative care but he wants to explore other potential reasons for shortness of breath and I think some evaluation for cardiac etiology be quite warranted.    I would recommend we do an echocardiogram to ensure he does not have pericardial effusion and also to look at his left and right ventricular systolic function.  I would also recommend a pharmacologic nuclear " study as he has known coronary artery disease and certainly could be having an anginal equivalent of shortness of breath.    We will try to get those in the near future.    Thank you for allowing us to participate in his care.        I have reviewed the note as documented.  This accurately captures the substance of my conversation with the patient.    Total time of video between patient and provider was 11 minutes   Start Time: 4:48 PM  Stop Time: 4:59 PM    Originating Location (pt. Location): Home    Distant Location (provider location):  [facility]     Mode of Communication:  Video Conference via [employer]       History of Present Illness/Subjective    Buddy Branch is a 70 y.o. male who is being evaluated via a billable video visit and has consented to a video visit. Buddy Branch has a history of coronary artery disease and metastatic prostate cancer.  He has had radiation therapy and other therapies for his prostate cancer through the [facility].  He has been struggling with shortness of breath which he says is constant.  His breathing is short with any activity and even sometimes just resting.  It does not really seem to get worse when he lies down at night.  He feels the head of his bed up just a little bit but sleeps on his side.  It seems to be worse as the day goes on and then about midnight it seems to get better.  He has been evaluated by the oncology service at [facility] and has been involved with palliative care as well.  He is very frustrated and reached out to see if there is any cardiac issues going on.    He is not having chest discomfort he gets occasional arrhythmias which she says can last from 5 to 15 minutes.  That makes him quite fatigued.  He has not had any syncopal episodes.  Patient denies any peripheral edema.      I have reviewed and updated the patient's Past Medical History, Social History, Family History and Medication List.     Physical Examination  performed via live video encounter Review of Systems   General Appearance:   no distress, normal body habitus, upright.   ENT/Mouth: membranes moist, no nasal discharge or bleeding gums.  Normal head shape, no evidence of injury or laceration.     EYES:  no scleral icterus, normal conjunctivae   Neck: no evidence of thyromegaly.  Supple   Chest/Lungs:    Non labored breathing.  No cough.   Cardiovascular:   No evidence of elevated jugular venous pressure.     Abdomen:  . No observe juandice.     Extremities: no cyanosis or clubbing noted.    Skin: no xanthelasma, normal skin color. No evidence of facial lacerations.      Neurologic: Normal arm motion bilateral, no tremors.  No evidence of focal defect.       Psychiatric: alert and , calm                                               Medical History  Surgical History Family History Social History   Past Medical History:   Diagnosis Date   ? Coronary artery disease    ? Hyperlipidemia    ? MI (myocardial infarction) (H)    ? PVC (premature ventricular contraction)     Past Surgical History:   Procedure Laterality Date   ? ANGIOPLASTY     ? CARDIAC CATHETERIZATION     ? CORONARY ANGIOPLASTY     ? CV CORONARY ANGIOGRAM N/A 8/28/2019    Procedure: Coronary Angiogram;  Surgeon: Odilon Roman MD;  Location: [facility] Cath Lab;  Service: Cardiology    Family History   Problem Relation Age of Onset   ? Heart attack Mother       Social History     Socioeconomic History   ? Marital status:      Spouse name: Not on file   ? Number of children: Not on file   ? Years of education: Not on file   ? Highest education level: Not on file   Occupational History     Employer: [employer]   Social Needs   ? Financial resource strain: Not on file   ? Food insecurity     Worry: Not on file     Inability: Not on file   ? Transportation needs     Medical: Not on file     Non-medical: Not on file   Tobacco Use   ? Smoking status: Former Smoker   ? Smokeless tobacco:  Never Used   Substance and Sexual Activity   ? Alcohol use: Yes     Alcohol/week: 10.0 - 15.0 standard drinks     Types: 10 - 15 Cans of beer per week   ? Drug use: Never   ? Sexual activity: Not on file   Lifestyle   ? Physical activity     Days per week: Not on file     Minutes per session: Not on file   ? Stress: Not on file   Relationships   ? Social connections     Talks on phone: Not on file     Gets together: Not on file     Attends [employer] service: Not on file     Active member of club or organization: Not on file     Attends meetings of clubs or organizations: Not on file     Relationship status: Not on file   ? Intimate partner violence     Fear of current or ex partner: Not on file     Emotionally abused: Not on file     Physically abused: Not on file     Forced sexual activity: Not on file   Other Topics Concern   ? Not on file   Social History Narrative   ? Not on file          Medications  Allergies   Current Outpatient Medications   Medication Sig Dispense Refill   ? betamethasone valerate (VALISONE) 0.1 % ointment Apply 1 application topically 2 (two) times a day as needed.     ? colchicine 0.6 mg tablet Take 0.6 mg by mouth daily as needed.            ? dexAMETHasone (DECADRON) 4 MG tablet      ? docusate sodium (COLACE) 50 MG capsule Take by mouth 2 (two) times a day as needed for constipation.     ? HYDROcodone-acetaminophen 5-325 mg per tablet Take 1-2 tablets by mouth every 4 (four) hours as needed (moderate or severe pain).     ? isosorbide mononitrate (IMDUR) 60 MG 24 hr tablet TAKE ONE TABLET BY MOUTH DAILY 90 tablet 2   ? losartan (COZAAR) 25 MG tablet TAKE 1 TABLET (25 MG TOTAL) BY MOUTH DAILY. 90 tablet 1   ? metoprolol succinate (TOPROL-XL) 100 MG 24 hr tablet TAKE 1 TABLET (100 MG TOTAL) BY MOUTH DAILY. 90 tablet 1   ? neomycin-bacitracin-polymyxin (NEOSPORIN) ointment Apply to penis at catheter insertion site three times a day while catheter is in place. 15 g 0   ? nitroglycerin  (NITROSTAT) 0.4 MG SL tablet Place 0.4 mg under the tongue every 5 (five) minutes as needed for chest pain.     ? omeprazole (PRILOSEC) 20 MG capsule Take 20 mg by mouth as needed (usually takes about 1x week).      ? ondansetron (ZOFRAN-ODT) 4 MG disintegrating tablet Take 4 mg by mouth.     ? rosuvastatin (CRESTOR) 20 MG tablet TAKE 1 TABLET (20 MG TOTAL) BY MOUTH AT BEDTIME. 90 tablet 1     No current facility-administered medications for this visit.     Allergies   Allergen Reactions   ? Ace Inhibitors Cough         Lab Results    Chemistry/lipid CBC Cardiac Enzymes/BNP/TSH/INR   Lab Results   Component Value Date    CHOL 189 04/26/2016    HDL 52 04/26/2016    LDLCALC 89 04/26/2016    TRIG 239 (H) 04/26/2016    CREATININE 0.87 10/02/2019    BUN 10 10/02/2019    K 3.8 10/02/2019     10/02/2019     10/02/2019    CO2 26 10/02/2019    Lab Results   Component Value Date    WBC 6.8 10/02/2019    HGB 10.4 (L) 10/02/2019    HCT 29.5 (L) 10/02/2019    MCV 93 10/02/2019     10/02/2019    Lab Results   Component Value Date    TROPONINI <0.01 04/28/2017    TSH 1.76 04/28/2017    INR 0.92 04/28/2017        Fawad Andres

## 2021-10-16 ENCOUNTER — HEALTH MAINTENANCE LETTER (OUTPATIENT)
Age: 71
End: 2021-10-16

## 2021-11-18 ENCOUNTER — TELEPHONE (OUTPATIENT)
Dept: CARDIOLOGY | Facility: CLINIC | Age: 71
End: 2021-11-18
Payer: COMMERCIAL

## 2021-11-18 NOTE — TELEPHONE ENCOUNTER
----- Message from Shellie Potts sent at 11/17/2021 10:16 AM CST -----  Regarding: ORIN pt  General phone call:    Caller: Buddy  Primary cardiologist: ORIN   Detailed reason for call: Dr. Juan Manuel Pfeiffer, Urologist wants to put him on Viagara however it doesn't work with his Isosorbide.  Can ORIN put him on a more friendly with Viagra med?    Best phone number: [phone]  Best time to contact: any  Ok to leave a detailedmessage? yes  Device? no    Additional Info:

## 2021-11-19 NOTE — TELEPHONE ENCOUNTER
----- Message -----  From: Fawad Andres MD  Sent: 11/19/2021  12:06 PM CST  To: Shantel Cabezas RN    He can stop the isosorbide if he is feeling well. Should follow BP's if he stops and go back on if symptoms change off of isosrbide.    Asif,    Fawad      Response and recommendations reviewed. Patient verbalized understanding and agreement with plan. He will call back if notes change in symptoms or elevation in BP. No further questions at this time. -claudetteb

## 2022-02-10 ENCOUNTER — OFFICE VISIT (OUTPATIENT)
Dept: CARDIOLOGY | Facility: CLINIC | Age: 72
End: 2022-02-10
Payer: MEDICARE

## 2022-02-10 VITALS
RESPIRATION RATE: 12 BRPM | HEIGHT: 68 IN | HEART RATE: 64 BPM | TEMPERATURE: 98.1 F | SYSTOLIC BLOOD PRESSURE: 128 MMHG | DIASTOLIC BLOOD PRESSURE: 70 MMHG | BODY MASS INDEX: 29.36 KG/M2 | WEIGHT: 193.7 LBS | OXYGEN SATURATION: 97 %

## 2022-02-10 DIAGNOSIS — I25.10 ATHEROSCLEROSIS OF NATIVE CORONARY ARTERY OF NATIVE HEART WITHOUT ANGINA PECTORIS: ICD-10-CM

## 2022-02-10 DIAGNOSIS — I10 ESSENTIAL HYPERTENSION: Primary | ICD-10-CM

## 2022-02-10 PROBLEM — R06.09 DYSPNEA ON EXERTION: Status: RESOLVED | Noted: 2021-02-09 | Resolved: 2022-02-10

## 2022-02-10 PROCEDURE — 99213 OFFICE O/P EST LOW 20 MIN: CPT | Performed by: INTERNAL MEDICINE

## 2022-02-10 RX ORDER — MORPHINE SULFATE 10 MG/5ML
SOLUTION ORAL PRN
COMMUNITY
Start: 2021-09-02

## 2022-02-10 ASSESSMENT — MIFFLIN-ST. JEOR: SCORE: 1618.12

## 2022-02-10 NOTE — PROGRESS NOTES
[facility]  [phone]        Assessment/Recommendations   Patient with known history of coronary artery disease, and treatment for metastatic prostate cancer which is now stable.  He struggled with some urinary issues and complications from the treatment of prostate cancer.  He is not having any angina, his blood pressure is at goal not had a lipid panel for some time.  We will get a fasting lipid panel up in [facility] at his primary care clinic and let us know the results.    He has not been exercising as much in part because of the difficulties he had with his bladder catheter implant but is started a walking program now and I have encouraged him to walk for 20 to 30 minutes at least 5 times a week.  I think you will start this as he enjoys it.    We will see him back in 1 year, but of course would be happy to see him sooner if questions or problems arise.    Thank you for allowing us to participate in his care.       History of Present Illness/Subjective    Mr. Buddy Branch is a 71 year old male with known coronary artery disease, status post distant percutaneous intervention.  An angiogram 2000 moderate lesion in the LAD.    He is struggled with treatments for prostate cancer but now is not on any treatment and the treatments were successful but he has got some side effects from the treatment such as radiation induced bladder injury and needed to have a catheter placed for some time.  He has not been exercising much this winter but has started to do little bit of walking.  He enjoys walking.  He denies any chest discomfort.  He denies orthopnea, paroxysmal nocturnal dyspnea or peripheral edema.  He has occasional palpitations which are very brief in nature and much less frequent than before.  Increasing his metoprolol seem to help this significantly.       Physical Examination Review of Systems   /70 (BP Location: Left arm, Patient Position: Sitting, Cuff Size: Adult  "Large)   Pulse 64   Temp 98.1  F (36.7  C) (Oral)   Resp 12   Ht 1.727 m (5' 8\")   Wt 87.9 kg (193 lb 11.2 oz)   SpO2 97%   BMI 29.45 kg/m    Body mass index is 29.45 kg/m .  Wt Readings from Last 3 Encounters:   02/10/22 87.9 kg (193 lb 11.2 oz)   08/20/20 85.3 kg (188 lb)   08/28/19 81.7 kg (180 lb 3.2 oz)     General Appearance:   Alert, cooperative and in no acute distress.   ENT/Mouth: Patient wearing a mask.      EYES:  no scleral icterus, normal conjunctivae   Neck: JVP normal. No Hepatojugular reflux. Thyroid not visualized.   Chest/Lungs:   Lungs are clear to auscultation, equal chest wall expansion.   Cardiovascular:   S1, S2 without murmur ,clicks or rubs. Brachial, radialpulses are intact and symetric. No carotid bruits noted   Abdomen:  Nontender. BS+.    Extremities: No cyanosis, clubbing or edema   Skin: no xanthelasma, warm.    Neurologic: normal arm movement bilateral, no tremors     Psychiatric: Appropriate affect.      Enc Vitals  BP: 128/70  Pulse: 64  Resp: 12  Temp: 98.1  F (36.7  C)  Temp src: Oral  SpO2: 97 %  Weight: 87.9 kg (193 lb 11.2 oz)  Height: 172.7 cm (5' 8\")                                           Medical History  Surgical History Family History Social History   Past Medical History:   Diagnosis Date     Coronary artery disease      Hyperlipidemia      MI (myocardial infarction) (H)      PVC (premature ventricular contraction)     Past Surgical History:   Procedure Laterality Date     ANGIOPLASTY       CARDIAC CATHETERIZATION       CORONARY ANGIOPLASTY       CV CORONARY ANGIOGRAM N/A 8/28/2019    Procedure: Coronary Angiogram;  Surgeon: Odilon Roman MD;  Location: [facility] Cath Lab;  Service: Cardiology    Family History   Problem Relation Age of Onset     Coronary Artery Disease Mother     Social History     Socioeconomic History     Marital status:      Spouse name: Not on file     Number of children: Not on file     Years of education: Not on file "     Highest education level: Not on file   Occupational History     Not on file   Tobacco Use     Smoking status: Former Smoker     Smokeless tobacco: Never Used   Substance and Sexual Activity     Alcohol use: Yes     Alcohol/week: 10.0 - 15.0 standard drinks     Drug use: Never     Sexual activity: Not on file   Other Topics Concern     Not on file   Social History Narrative     Not on file     Social Determinants of Health     Financial Resource Strain: Not on file   Food Insecurity: Not on file   Transportation Needs: Not on file   Physical Activity: Not on file   Stress: Not on file   Social Connections: Not on file   Intimate Partner Violence: Not on file   Housing Stability: Not on file          Medications  Allergies   Current Outpatient Medications   Medication Sig Dispense Refill     aspirin (ASA) 81 MG EC tablet Take 81 mg by mouth daily       betamethasone valerate (VALISONE) 0.1 % ointment [BETAMETHASONE VALERATE (VALISONE) 0.1 % OINTMENT] Apply 1 application topically 2 (two) times a day as needed.       blood glucose (CONTOUR NEXT TEST) test strip Dispense item covered by pt ins. E11.9 NIDDM type II - Test 1 time/day       docusate sodium (COLACE) 50 MG capsule [DOCUSATE SODIUM (COLACE) 50 MG CAPSULE] Take by mouth 2 (two) times a day as needed for constipation.       HYDROcodone-acetaminophen 5-325 mg per tablet [HYDROCODONE-ACETAMINOPHEN 5-325 MG PER TABLET] Take 1-2 tablets by mouth every 4 (four) hours as needed (moderate or severe pain).       losartan (COZAAR) 25 MG tablet [LOSARTAN (COZAAR) 25 MG TABLET] TAKE 1 TABLET (25 MG TOTAL) BY MOUTH DAILY. 90 tablet 1     metoprolol succinate (TOPROL-XL) 100 MG 24 hr tablet [METOPROLOL SUCCINATE (TOPROL-XL) 100 MG 24 HR TABLET] TAKE 1 TABLET (100 MG TOTAL) BY MOUTH DAILY. 90 tablet 1     morphine 10 MG/5ML solution as needed       neomycin-bacitracin-polymyxin (NEOSPORIN) ointment [NEOMYCIN-BACITRACIN-POLYMYXIN (NEOSPORIN) OINTMENT] Apply to penis at  catheter insertion site three times a day while catheter is in place. 15 g 0     nitroglycerin (NITROSTAT) 0.4 MG SL tablet [NITROGLYCERIN (NITROSTAT) 0.4 MG SL TABLET] Place 0.4 mg under the tongue every 5 (five) minutes as needed for chest pain.       omeprazole (PRILOSEC) 20 MG capsule [OMEPRAZOLE (PRILOSEC) 20 MG CAPSULE] Take 20 mg by mouth as needed (usually takes about 1x week).        ondansetron (ZOFRAN-ODT) 4 MG disintegrating tablet [ONDANSETRON (ZOFRAN-ODT) 4 MG DISINTEGRATING TABLET] Take 4 mg by mouth.       rosuvastatin (CRESTOR) 20 MG tablet [ROSUVASTATIN (CRESTOR) 20 MG TABLET] TAKE 1 TABLET (20 MG TOTAL) BY MOUTH AT BEDTIME. 90 tablet 1    Allergies   Allergen Reactions     Ace Inhibitors Cough     Metformin Diarrhea         Lab Results    Chemistry/lipid CBC Cardiac Enzymes/BNP/TSH/INR   Lab Results   Component Value Date    CHOL 189 04/26/2016    HDL 52 04/26/2016    TRIG 239 (H) 04/26/2016    BUN 10 10/02/2019     10/02/2019    CO2 26 10/02/2019    Lab Results   Component Value Date    WBC 6.8 10/02/2019    HGB 10.4 (L) 10/02/2019    HCT 29.5 (L) 10/02/2019    MCV 93 10/02/2019     10/02/2019    No results found for: CKTOTAL, CKMB, TROPONINI, BNP, TSH, INR

## 2022-07-23 ENCOUNTER — HEALTH MAINTENANCE LETTER (OUTPATIENT)
Age: 72
End: 2022-07-23

## 2022-10-01 ENCOUNTER — HEALTH MAINTENANCE LETTER (OUTPATIENT)
Age: 72
End: 2022-10-01

## 2023-03-01 ENCOUNTER — VIRTUAL VISIT (OUTPATIENT)
Dept: CARDIOLOGY | Facility: CLINIC | Age: 73
End: 2023-03-01
Payer: MEDICARE

## 2023-03-01 DIAGNOSIS — E78.2 MIXED HYPERLIPIDEMIA: Primary | ICD-10-CM

## 2023-03-01 DIAGNOSIS — R00.2 PALPITATIONS: ICD-10-CM

## 2023-03-01 DIAGNOSIS — I25.10 ATHEROSCLEROSIS OF NATIVE CORONARY ARTERY OF NATIVE HEART WITHOUT ANGINA PECTORIS: ICD-10-CM

## 2023-03-01 DIAGNOSIS — R06.09 DYSPNEA ON EXERTION: ICD-10-CM

## 2023-03-01 DIAGNOSIS — R53.83 OTHER FATIGUE: ICD-10-CM

## 2023-03-01 PROCEDURE — 99214 OFFICE O/P EST MOD 30 MIN: CPT | Mod: 95 | Performed by: INTERNAL MEDICINE

## 2023-03-01 NOTE — PROGRESS NOTES
"Telephone visit with patient as the weather and roads did not allow him to make it to the clinic today.  Patient with known coronary artery disease who is having more symptoms of shortness of breath with activity.  His symptoms changed in the fall 2022 and he started to become more short of breath.  Now when he exercises he feels just \"shot\".  He does not have orthopnea, paroxysmal nocturnal dyspnea, peripheral edema, syncope or near syncopal episodes but is having daily palpitations where he feels his heart is racing and this occurs when he is sitting at rest.    Recent lipid panel showed high triglycerides.  LDL cannot be calculated.    Would recommend evaluation for myocardial ischemia and left ventricular systolic function we can do this with a pharmacologic nuclear study.  Would also recommend a Holter monitor.    After these tests are completed, we will call him with results but may need a face-to-face visit with him as well.  Would also have a low threshold for an echocardiogram if the above tests are unremarkable.    We will need to discuss strategies to lower triglycerides as well.    Total telephone time today was 8 minutes.  Total time with chart review, telephone call, documentation, and  was 21 minutes.  "

## 2023-03-09 ENCOUNTER — HOSPITAL ENCOUNTER (OUTPATIENT)
Dept: CARDIOLOGY | Facility: CLINIC | Age: 73
Discharge: HOME | End: 2023-03-09
Attending: INTERNAL MEDICINE
Payer: MEDICARE

## 2023-03-09 ENCOUNTER — HOSPITAL ENCOUNTER (OUTPATIENT)
Dept: NUCLEAR MEDICINE | Facility: CLINIC | Age: 73
Discharge: HOME | End: 2023-03-09
Attending: INTERNAL MEDICINE
Payer: MEDICARE

## 2023-03-09 DIAGNOSIS — R00.2 PALPITATIONS: ICD-10-CM

## 2023-03-09 DIAGNOSIS — R06.09 DYSPNEA ON EXERTION: ICD-10-CM

## 2023-03-09 DIAGNOSIS — R53.83 OTHER FATIGUE: ICD-10-CM

## 2023-03-09 DIAGNOSIS — E78.2 MIXED HYPERLIPIDEMIA: ICD-10-CM

## 2023-03-09 DIAGNOSIS — I25.10 ATHEROSCLEROSIS OF NATIVE CORONARY ARTERY OF NATIVE HEART WITHOUT ANGINA PECTORIS: ICD-10-CM

## 2023-03-09 LAB
CV STRESS CURRENT BP HE: NORMAL
CV STRESS CURRENT HR HE: 101
CV STRESS CURRENT HR HE: 102
CV STRESS CURRENT HR HE: 106
CV STRESS CURRENT HR HE: 107
CV STRESS CURRENT HR HE: 65
CV STRESS CURRENT HR HE: 67
CV STRESS CURRENT HR HE: 68
CV STRESS CURRENT HR HE: 68
CV STRESS CURRENT HR HE: 73
CV STRESS CURRENT HR HE: 75
CV STRESS CURRENT HR HE: 76
CV STRESS CURRENT HR HE: 82
CV STRESS CURRENT HR HE: 84
CV STRESS CURRENT HR HE: 91
CV STRESS CURRENT HR HE: 91
CV STRESS CURRENT HR HE: 99
CV STRESS CURRENT HR HE: 99
CV STRESS DEVIATION TIME HE: NORMAL
CV STRESS ECHO PERCENT HR HE: NORMAL
CV STRESS EXERCISE STAGE HE: NORMAL
CV STRESS FINAL RESTING BP HE: NORMAL
CV STRESS FINAL RESTING HR HE: 68
CV STRESS MAX HR HE: 108
CV STRESS MAX TREADMILL GRADE HE: 0
CV STRESS MAX TREADMILL SPEED HE: 0
CV STRESS PEAK DIA BP HE: NORMAL
CV STRESS PEAK SYS BP HE: NORMAL
CV STRESS PHASE HE: NORMAL
CV STRESS PROTOCOL HE: NORMAL
CV STRESS RESTING PT POSITION HE: NORMAL
CV STRESS ST DEVIATION AMOUNT HE: NORMAL
CV STRESS ST DEVIATION ELEVATION HE: NORMAL
CV STRESS ST EVELATION AMOUNT HE: NORMAL
CV STRESS TEST TYPE HE: NORMAL
CV STRESS TOTAL STAGE TIME MIN 1 HE: NORMAL
NUC STRESS EJECTION FRACTION: 59 %
RATE PRESSURE PRODUCT: NORMAL
STRESS ECHO BASELINE DIASTOLIC HE: 82
STRESS ECHO BASELINE HR: 64
STRESS ECHO BASELINE SYSTOLIC BP: 152
STRESS ECHO CALCULATED PERCENT HR: 72 %
STRESS ECHO LAST STRESS DIASTOLIC BP: 78
STRESS ECHO LAST STRESS HR: 99
STRESS ECHO LAST STRESS SYSTOLIC BP: 174
STRESS ECHO TARGET HR: 150
STRESS/REST PERFUSION RATIO: 1.36

## 2023-03-09 PROCEDURE — G1010 CDSM STANSON: HCPCS | Performed by: INTERNAL MEDICINE

## 2023-03-09 PROCEDURE — 78452 HT MUSCLE IMAGE SPECT MULT: CPT | Mod: 26 | Performed by: INTERNAL MEDICINE

## 2023-03-09 PROCEDURE — A9500 TC99M SESTAMIBI: HCPCS | Performed by: INTERNAL MEDICINE

## 2023-03-09 PROCEDURE — 250N000011 HC RX IP 250 OP 636: Performed by: INTERNAL MEDICINE

## 2023-03-09 PROCEDURE — 93016 CV STRESS TEST SUPVJ ONLY: CPT | Performed by: INTERNAL MEDICINE

## 2023-03-09 PROCEDURE — 93226 XTRNL ECG REC<48 HR SCAN A/R: CPT

## 2023-03-09 PROCEDURE — 343N000001 HC RX 343: Performed by: INTERNAL MEDICINE

## 2023-03-09 PROCEDURE — 93017 CV STRESS TEST TRACING ONLY: CPT | Performed by: INTERNAL MEDICINE

## 2023-03-09 PROCEDURE — 93018 CV STRESS TEST I&R ONLY: CPT | Performed by: INTERNAL MEDICINE

## 2023-03-09 PROCEDURE — G1010 CDSM STANSON: HCPCS

## 2023-03-09 PROCEDURE — 93017 CV STRESS TEST TRACING ONLY: CPT

## 2023-03-09 RX ORDER — AMINOPHYLLINE 25 MG/ML
50 INJECTION, SOLUTION INTRAVENOUS
Status: DISCONTINUED | OUTPATIENT
Start: 2023-03-09 | End: 2023-03-09 | Stop reason: HOSPADM

## 2023-03-09 RX ORDER — REGADENOSON 0.08 MG/ML
0.4 INJECTION, SOLUTION INTRAVENOUS ONCE
Status: COMPLETED | OUTPATIENT
Start: 2023-03-09 | End: 2023-03-09

## 2023-03-09 RX ADMIN — Medication 33 MILLICURIE: at 10:15

## 2023-03-09 RX ADMIN — AMINOPHYLLINE 50 MG: 25 INJECTION, SOLUTION INTRAVENOUS at 10:18

## 2023-03-09 RX ADMIN — REGADENOSON 0.4 MG: 0.08 INJECTION, SOLUTION INTRAVENOUS at 10:15

## 2023-03-09 RX ADMIN — Medication 8.7 MILLICURIE: at 09:10

## 2023-03-09 NOTE — PROGRESS NOTES
Known CAD.  Has increased fatigue and shortness of breath over the last 6 months.  States has increase in palpitations with feeling of racing heart.  Denies chest pain.  Fide Evans RN

## 2023-03-13 PROCEDURE — 93227 XTRNL ECG REC<48 HR R&I: CPT | Performed by: INTERNAL MEDICINE

## 2023-03-15 ENCOUNTER — PREP FOR PROCEDURE (OUTPATIENT)
Dept: CARDIOLOGY | Facility: CLINIC | Age: 73
End: 2023-03-15
Payer: MEDICARE

## 2023-03-15 ENCOUNTER — DOCUMENTATION ONLY (OUTPATIENT)
Dept: CARDIOLOGY | Facility: CLINIC | Age: 73
End: 2023-03-15
Payer: MEDICARE

## 2023-03-15 DIAGNOSIS — R06.09 DYSPNEA ON EXERTION: ICD-10-CM

## 2023-03-15 DIAGNOSIS — R94.39 ABNORMAL CARDIOVASCULAR STRESS TEST: ICD-10-CM

## 2023-03-15 DIAGNOSIS — R06.09 DYSPNEA ON EXERTION: Primary | ICD-10-CM

## 2023-03-15 DIAGNOSIS — R94.39 ABNORMAL CARDIOVASCULAR STRESS TEST: Primary | ICD-10-CM

## 2023-03-15 RX ORDER — ASPIRIN 325 MG
325 TABLET ORAL ONCE
Status: CANCELLED | OUTPATIENT
Start: 2023-03-22 | End: 2023-03-15

## 2023-03-15 RX ORDER — LIDOCAINE 40 MG/G
CREAM TOPICAL
Status: CANCELLED | OUTPATIENT
Start: 2023-03-15

## 2023-03-15 RX ORDER — FENTANYL CITRATE 50 UG/ML
25 INJECTION, SOLUTION INTRAMUSCULAR; INTRAVENOUS
Status: CANCELLED | OUTPATIENT
Start: 2023-03-22

## 2023-03-15 RX ORDER — ASPIRIN 81 MG/1
243 TABLET, CHEWABLE ORAL ONCE
Status: CANCELLED | OUTPATIENT
Start: 2023-03-22

## 2023-03-15 RX ORDER — SODIUM CHLORIDE 9 MG/ML
INJECTION, SOLUTION INTRAVENOUS CONTINUOUS
Status: CANCELLED | OUTPATIENT
Start: 2023-03-22

## 2023-03-15 NOTE — PROGRESS NOTES
Will also send as mychart instructions    From: Malick Martinez  Sent: 3/15/2023  10:21 AM CDT  To: Jaimieramona Singleton  Subject: RE: ORIN pt                                       Case type: CA    Procedure Physician(s): KULDEEP    Procedure Date and Patient Arrival Time: 03/22 @ 0700    H&P: 3/11 W/ THJ     Alerts: RENAL, RADIATION EXPOSURE (PROSTATE CANCER), PRIOR CA W/O STENTS 2019    Pre-Procedure Lab Appt: 03/21 @ 11:45 MW     THANK YOU,  KAIT      ----- Message -----  From: Mani Jaimie  Sent: 3/15/2023   9:10 AM CDT  To: [facility] Procedure  [facility]  Subject: THJ pt                                           Case request: Coronary Angiogram with Possible Percutaneous Coronary Intervention  Ordering provider: ORIN  H/P: ORIN on 03/01  Alerts: renal protocol, radiation exposure (prostate cancer), prior angio without stents 2019. No scheduling conflicts in the next 1-2 weeks.    Thanks!        Buddy Branch  [address] [address]  [phone] ([place])     PCP:  Felisa Knott  H&P completed by:  ORIN 03/01  Admit date 03/22 Arrival time:  07:00  Anticoagulation:  NA  Previous PCI: No  Bypass Grafts: No  Renal Issues: Yes  Diabetic?: No  Device?: No  Type:     Ambulation status: independent    Reason for Visit:  Telephone call to discuss pre-procedure education in preparation for: Coronary Angiogram with Possible PCI    Procedure Prep:  EKG results obtained, dated: To be completed on day of cath lab procedure  Hemogram results obtained: To be completed on day of cath lab procedure  Basic Metabolic Panel results obtained: Completed on 03/21  Pertinent cardiac test results: abnormal stress test    Patient Education    1. Your arrival time is 07:00 am.  [place] [address] - Main Entrance of the Hospital  2. Please plan on being at the hospital all day.  3. At any time, emergencies and/or urgent cases may come up which could  delay the start of your procedure.    COVID Testing Instructions  *Mandatory COVID Testing:   ALL Patients will need to complete a COVID test no sooner than 4 days prior to their procedure (regardless of vaccination status).      To schedule COVID testing Please call [phone]    If you want to complete this at an outside facility please call them to find out if they will have the results within the appropriate time frame and their fax number.  You will need to provide us with that information so we can send the order.    The facility completing the test will need to fax the results to [phone]    If you are running into and issues please call us.     Pre-procedure instructions  Take your temperature in the morning prior to coming in.  If your temperature is 100 F please call [facility] [phone] and notify them.  If you do not have access to a thermometer at home, please come in for testing.  If you are running a temperature your procedure may be rescheduled.  Patient instructed to not Eat or Drink after midnight.  Patient instructed to shower the evening before or the morning of the procedure.  Patient instructed to arrange for transportation home following procedure from a responsible family member or friend. No driving for at least 24 hours.  Patient instructed to have a responsible adult with them for 24 hours post-procedure.  Post-procedure follow up process.  Conscious sedation discussed.      Pre-procedure medication instructions.  Continue medications as scheduled, with a small amount of water on the day of the procedure unless indicated. (NO Diabetic Medications or Blood Thinners)  Pt instructed not to consume Alcohol, Tobacco, Caffeine, or Carbonated beverages 12 hours prior to procedure.  Patient instructed to take 324 mg of Aspirin the morning of procedure: Yes  Other medication: instructed to only take Metoprolol Succinate 100 mg a.m. of the procedure.       Patient states understanding of  procedure and agrees to proceed.    *PATIENTS RECORDS AVAILABLE IN [facility] UNLESS OTHERWISE INDICATED*      Patient Active Problem List   Diagnosis     Mixed hyperlipidemia     Hypertension     Coronary Artery Disease     Frequent PVCs     Atherosclerosis of native coronary artery of native heart without angina pectoris     Sepsis (H)     S/P prostatectomy       Current Outpatient Medications   Medication Sig Dispense Refill     aspirin (ASA) 81 MG EC tablet Take 81 mg by mouth daily       betamethasone valerate (VALISONE) 0.1 % ointment [BETAMETHASONE VALERATE (VALISONE) 0.1 % OINTMENT] Apply 1 application topically 2 (two) times a day as needed.       blood glucose (CONTOUR NEXT TEST) test strip Dispense item covered by pt ins. E11.9 NIDDM type II - Test 1 time/day       docusate sodium (COLACE) 50 MG capsule [DOCUSATE SODIUM (COLACE) 50 MG CAPSULE] Take by mouth 2 (two) times a day as needed for constipation.       losartan (COZAAR) 25 MG tablet [LOSARTAN (COZAAR) 25 MG TABLET] TAKE 1 TABLET (25 MG TOTAL) BY MOUTH DAILY. 90 tablet 1     metoprolol succinate (TOPROL-XL) 100 MG 24 hr tablet [METOPROLOL SUCCINATE (TOPROL-XL) 100 MG 24 HR TABLET] TAKE 1 TABLET (100 MG TOTAL) BY MOUTH DAILY. 90 tablet 1     morphine 10 MG/5ML solution as needed       neomycin-bacitracin-polymyxin (NEOSPORIN) ointment [NEOMYCIN-BACITRACIN-POLYMYXIN (NEOSPORIN) OINTMENT] Apply to penis at catheter insertion site three times a day while catheter is in place. 15 g 0     nitroglycerin (NITROSTAT) 0.4 MG SL tablet [NITROGLYCERIN (NITROSTAT) 0.4 MG SL TABLET] Place 0.4 mg under the tongue every 5 (five) minutes as needed for chest pain.       omeprazole (PRILOSEC) 20 MG capsule [OMEPRAZOLE (PRILOSEC) 20 MG CAPSULE] Take 20 mg by mouth as needed (usually takes about 1x week).        ondansetron (ZOFRAN-ODT) 4 MG disintegrating tablet [ONDANSETRON (ZOFRAN-ODT) 4 MG DISINTEGRATING TABLET] Take 4 mg by mouth.       rosuvastatin (CRESTOR) 20 MG  tablet [ROSUVASTATIN (CRESTOR) 20 MG TABLET] TAKE 1 TABLET (20 MG TOTAL) BY MOUTH AT BEDTIME. 90 tablet 1       Allergies   Allergen Reactions     Ace Inhibitors Cough     Metformin Diarrhea       Jaimie Singleton on 3/15/2023 at 12:30 PM

## 2023-03-17 ENCOUNTER — TELEPHONE (OUTPATIENT)
Dept: CARDIOLOGY | Facility: CLINIC | Age: 73
End: 2023-03-17
Payer: MEDICARE

## 2023-03-17 NOTE — TELEPHONE ENCOUNTER
Provided cor angiogram with poss pci teach. Message will also be reviewed in mychart. No further questions or concerns noted.-VAUGHN

## 2023-03-20 LAB
ABO/RH(D): NORMAL
ANTIBODY SCREEN: NEGATIVE
SPECIMEN EXPIRATION DATE: NORMAL

## 2023-03-21 ENCOUNTER — LAB (OUTPATIENT)
Dept: CARDIOLOGY | Facility: CLINIC | Age: 73
End: 2023-03-21
Payer: MEDICARE

## 2023-03-21 DIAGNOSIS — R94.39 ABNORMAL CARDIOVASCULAR STRESS TEST: ICD-10-CM

## 2023-03-21 DIAGNOSIS — R06.09 DYSPNEA ON EXERTION: ICD-10-CM

## 2023-03-21 LAB
ANION GAP SERPL CALCULATED.3IONS-SCNC: 16 MMOL/L (ref 7–15)
BUN SERPL-MCNC: 20.7 MG/DL (ref 8–23)
CALCIUM SERPL-MCNC: 9.4 MG/DL (ref 8.8–10.2)
CHLORIDE SERPL-SCNC: 101 MMOL/L (ref 98–107)
CREAT SERPL-MCNC: 0.99 MG/DL (ref 0.67–1.17)
DEPRECATED HCO3 PLAS-SCNC: 22 MMOL/L (ref 22–29)
ERYTHROCYTE [DISTWIDTH] IN BLOOD BY AUTOMATED COUNT: 12.9 % (ref 10–15)
GFR SERPL CREATININE-BSD FRML MDRD: 82 ML/MIN/1.73M2
GLUCOSE SERPL-MCNC: 138 MG/DL (ref 70–99)
HCT VFR BLD AUTO: 39.4 % (ref 40–53)
HGB BLD-MCNC: 13.7 G/DL (ref 13.3–17.7)
MCH RBC QN AUTO: 32.6 PG (ref 26.5–33)
MCHC RBC AUTO-ENTMCNC: 34.8 G/DL (ref 31.5–36.5)
MCV RBC AUTO: 94 FL (ref 78–100)
PLATELET # BLD AUTO: 153 10E3/UL (ref 150–450)
POTASSIUM SERPL-SCNC: 4.9 MMOL/L (ref 3.4–5.3)
RBC # BLD AUTO: 4.2 10E6/UL (ref 4.4–5.9)
SODIUM SERPL-SCNC: 139 MMOL/L (ref 136–145)
WBC # BLD AUTO: 6 10E3/UL (ref 4–11)

## 2023-03-21 PROCEDURE — 36415 COLL VENOUS BLD VENIPUNCTURE: CPT

## 2023-03-21 PROCEDURE — 85027 COMPLETE CBC AUTOMATED: CPT

## 2023-03-21 PROCEDURE — 86850 RBC ANTIBODY SCREEN: CPT

## 2023-03-21 PROCEDURE — 86901 BLOOD TYPING SEROLOGIC RH(D): CPT

## 2023-03-21 PROCEDURE — 86900 BLOOD TYPING SEROLOGIC ABO: CPT

## 2023-03-21 PROCEDURE — 80048 BASIC METABOLIC PNL TOTAL CA: CPT

## 2023-03-22 ENCOUNTER — HOSPITAL ENCOUNTER (OUTPATIENT)
Facility: HOSPITAL | Age: 73
Discharge: HOME | End: 2023-03-22
Attending: INTERNAL MEDICINE | Admitting: INTERNAL MEDICINE
Payer: MEDICARE

## 2023-03-22 ENCOUNTER — APPOINTMENT (OUTPATIENT)
Dept: CARDIOLOGY | Facility: HOSPITAL | Age: 73
End: 2023-03-22
Attending: INTERNAL MEDICINE
Payer: MEDICARE

## 2023-03-22 VITALS
DIASTOLIC BLOOD PRESSURE: 81 MMHG | SYSTOLIC BLOOD PRESSURE: 138 MMHG | TEMPERATURE: 98 F | BODY MASS INDEX: 28.04 KG/M2 | RESPIRATION RATE: 21 BRPM | OXYGEN SATURATION: 97 % | WEIGHT: 185 LBS | HEIGHT: 68 IN | HEART RATE: 73 BPM

## 2023-03-22 DIAGNOSIS — E78.2 MIXED HYPERLIPIDEMIA: ICD-10-CM

## 2023-03-22 DIAGNOSIS — R94.39 ABNORMAL CARDIOVASCULAR STRESS TEST: ICD-10-CM

## 2023-03-22 DIAGNOSIS — I25.10 ATHEROSCLEROSIS OF NATIVE CORONARY ARTERY OF NATIVE HEART WITHOUT ANGINA PECTORIS: Primary | ICD-10-CM

## 2023-03-22 DIAGNOSIS — R06.09 DYSPNEA ON EXERTION: ICD-10-CM

## 2023-03-22 DIAGNOSIS — R06.09 DOE (DYSPNEA ON EXERTION): ICD-10-CM

## 2023-03-22 LAB
ACT BLD: 322 SECONDS (ref 74–150)
ATRIAL RATE - MUSE: 61 BPM
ATRIAL RATE - MUSE: 68 BPM
CHOLEST SERPL-MCNC: 191 MG/DL
DIASTOLIC BLOOD PRESSURE - MUSE: NORMAL MMHG
DIASTOLIC BLOOD PRESSURE - MUSE: NORMAL MMHG
HDLC SERPL-MCNC: 40 MG/DL
INTERPRETATION ECG - MUSE: NORMAL
INTERPRETATION ECG - MUSE: NORMAL
LDLC SERPL CALC-MCNC: 77 MG/DL
LVEF ECHO: NORMAL
NONHDLC SERPL-MCNC: 151 MG/DL
NT-PROBNP SERPL-MCNC: 37 PG/ML (ref 0–900)
P AXIS - MUSE: 31 DEGREES
P AXIS - MUSE: 44 DEGREES
PR INTERVAL - MUSE: 196 MS
PR INTERVAL - MUSE: 202 MS
QRS DURATION - MUSE: 100 MS
QRS DURATION - MUSE: 94 MS
QT - MUSE: 418 MS
QT - MUSE: 444 MS
QTC - MUSE: 444 MS
QTC - MUSE: 446 MS
R AXIS - MUSE: -14 DEGREES
R AXIS - MUSE: -4 DEGREES
SYSTOLIC BLOOD PRESSURE - MUSE: NORMAL MMHG
SYSTOLIC BLOOD PRESSURE - MUSE: NORMAL MMHG
T AXIS - MUSE: 13 DEGREES
T AXIS - MUSE: 5 DEGREES
TRIGL SERPL-MCNC: 369 MG/DL
VENTRICULAR RATE- MUSE: 61 BPM
VENTRICULAR RATE- MUSE: 68 BPM

## 2023-03-22 PROCEDURE — 93306 TTE W/DOPPLER COMPLETE: CPT | Mod: 26 | Performed by: INTERNAL MEDICINE

## 2023-03-22 PROCEDURE — C1894 INTRO/SHEATH, NON-LASER: HCPCS | Performed by: INTERNAL MEDICINE

## 2023-03-22 PROCEDURE — C1725 CATH, TRANSLUMIN NON-LASER: HCPCS | Performed by: INTERNAL MEDICINE

## 2023-03-22 PROCEDURE — 999N000054 HC STATISTIC EKG NON-CHARGEABLE

## 2023-03-22 PROCEDURE — 250N000013 HC RX MED GY IP 250 OP 250 PS 637: Performed by: NURSE PRACTITIONER

## 2023-03-22 PROCEDURE — 80061 LIPID PANEL: CPT | Performed by: INTERNAL MEDICINE

## 2023-03-22 PROCEDURE — 92920 PRQ TRLUML C ANGIOP 1ART&/BR: CPT | Mod: LD | Performed by: INTERNAL MEDICINE

## 2023-03-22 PROCEDURE — 83880 ASSAY OF NATRIURETIC PEPTIDE: CPT | Performed by: INTERNAL MEDICINE

## 2023-03-22 PROCEDURE — 99153 MOD SED SAME PHYS/QHP EA: CPT | Performed by: INTERNAL MEDICINE

## 2023-03-22 PROCEDURE — 92920 PRQ TRLUML C ANGIOP 1ART&/BR: CPT | Performed by: INTERNAL MEDICINE

## 2023-03-22 PROCEDURE — 255N000002 HC RX 255 OP 636: Performed by: INTERNAL MEDICINE

## 2023-03-22 PROCEDURE — C1769 GUIDE WIRE: HCPCS | Performed by: INTERNAL MEDICINE

## 2023-03-22 PROCEDURE — 250N000013 HC RX MED GY IP 250 OP 250 PS 637: Performed by: INTERNAL MEDICINE

## 2023-03-22 PROCEDURE — 93458 L HRT ARTERY/VENTRICLE ANGIO: CPT | Mod: 26 | Performed by: INTERNAL MEDICINE

## 2023-03-22 PROCEDURE — 93010 ELECTROCARDIOGRAM REPORT: CPT | Mod: HOP | Performed by: GENERAL ACUTE CARE HOSPITAL

## 2023-03-22 PROCEDURE — 99214 OFFICE O/P EST MOD 30 MIN: CPT | Mod: 25 | Performed by: NURSE PRACTITIONER

## 2023-03-22 PROCEDURE — 250N000011 HC RX IP 250 OP 636: Performed by: INTERNAL MEDICINE

## 2023-03-22 PROCEDURE — C1887 CATHETER, GUIDING: HCPCS | Performed by: INTERNAL MEDICINE

## 2023-03-22 PROCEDURE — 99152 MOD SED SAME PHYS/QHP 5/>YRS: CPT | Performed by: INTERNAL MEDICINE

## 2023-03-22 PROCEDURE — 93005 ELECTROCARDIOGRAM TRACING: CPT

## 2023-03-22 PROCEDURE — 85347 COAGULATION TIME ACTIVATED: CPT

## 2023-03-22 PROCEDURE — 93010 ELECTROCARDIOGRAM REPORT: CPT | Mod: HOP | Performed by: INTERNAL MEDICINE

## 2023-03-22 PROCEDURE — 250N000009 HC RX 250: Performed by: INTERNAL MEDICINE

## 2023-03-22 PROCEDURE — 93458 L HRT ARTERY/VENTRICLE ANGIO: CPT | Performed by: INTERNAL MEDICINE

## 2023-03-22 PROCEDURE — 272N000001 HC OR GENERAL SUPPLY STERILE: Performed by: INTERNAL MEDICINE

## 2023-03-22 PROCEDURE — 93306 TTE W/DOPPLER COMPLETE: CPT

## 2023-03-22 PROCEDURE — 258N000003 HC RX IP 258 OP 636: Performed by: INTERNAL MEDICINE

## 2023-03-22 PROCEDURE — 36415 COLL VENOUS BLD VENIPUNCTURE: CPT | Performed by: INTERNAL MEDICINE

## 2023-03-22 RX ORDER — ASPIRIN 81 MG/1
81 TABLET, CHEWABLE ORAL DAILY
Qty: 30 TABLET | Refills: 3 | Status: SHIPPED | OUTPATIENT
Start: 2023-03-22

## 2023-03-22 RX ORDER — NITROGLYCERIN 0.4 MG/1
TABLET SUBLINGUAL
Status: DISCONTINUED | OUTPATIENT
Start: 2023-03-22 | End: 2023-03-22 | Stop reason: HOSPADM

## 2023-03-22 RX ORDER — OXYCODONE HYDROCHLORIDE 5 MG/1
10 TABLET ORAL EVERY 4 HOURS PRN
Status: DISCONTINUED | OUTPATIENT
Start: 2023-03-22 | End: 2023-03-22 | Stop reason: HOSPADM

## 2023-03-22 RX ORDER — NITROGLYCERIN 5 MG/ML
VIAL (ML) INTRAVENOUS
Status: DISCONTINUED | OUTPATIENT
Start: 2023-03-22 | End: 2023-03-22 | Stop reason: HOSPADM

## 2023-03-22 RX ORDER — NITROGLYCERIN 0.4 MG/1
0.4 TABLET SUBLINGUAL EVERY 5 MIN PRN
Status: DISCONTINUED | OUTPATIENT
Start: 2023-03-22 | End: 2023-03-22 | Stop reason: HOSPADM

## 2023-03-22 RX ORDER — NALOXONE HYDROCHLORIDE 0.4 MG/ML
0.2 INJECTION, SOLUTION INTRAMUSCULAR; INTRAVENOUS; SUBCUTANEOUS
Status: DISCONTINUED | OUTPATIENT
Start: 2023-03-22 | End: 2023-03-22 | Stop reason: HOSPADM

## 2023-03-22 RX ORDER — ASPIRIN 81 MG/1
81 TABLET, CHEWABLE ORAL ONCE
Status: COMPLETED | OUTPATIENT
Start: 2023-03-22 | End: 2023-03-22

## 2023-03-22 RX ORDER — SODIUM CHLORIDE 9 MG/ML
INJECTION, SOLUTION INTRAVENOUS CONTINUOUS
Status: DISCONTINUED | OUTPATIENT
Start: 2023-03-22 | End: 2023-03-22 | Stop reason: HOSPADM

## 2023-03-22 RX ORDER — ROSUVASTATIN CALCIUM 40 MG/1
40 TABLET, COATED ORAL AT BEDTIME
Qty: 90 TABLET | Refills: 3 | Status: SHIPPED | OUTPATIENT
Start: 2023-03-22 | End: 2024-03-21

## 2023-03-22 RX ORDER — HEPARIN SODIUM 1000 [USP'U]/ML
INJECTION, SOLUTION INTRAVENOUS; SUBCUTANEOUS
Status: DISCONTINUED | OUTPATIENT
Start: 2023-03-22 | End: 2023-03-22 | Stop reason: HOSPADM

## 2023-03-22 RX ORDER — OXYCODONE HYDROCHLORIDE 5 MG/1
5 TABLET ORAL EVERY 4 HOURS PRN
Status: DISCONTINUED | OUTPATIENT
Start: 2023-03-22 | End: 2023-03-22 | Stop reason: HOSPADM

## 2023-03-22 RX ORDER — ASPIRIN 81 MG/1
81 TABLET, CHEWABLE ORAL ONCE
Status: DISCONTINUED | OUTPATIENT
Start: 2023-03-22 | End: 2023-03-22 | Stop reason: HOSPADM

## 2023-03-22 RX ORDER — NALOXONE HYDROCHLORIDE 0.4 MG/ML
0.4 INJECTION, SOLUTION INTRAMUSCULAR; INTRAVENOUS; SUBCUTANEOUS
Status: DISCONTINUED | OUTPATIENT
Start: 2023-03-22 | End: 2023-03-22 | Stop reason: HOSPADM

## 2023-03-22 RX ORDER — FENTANYL CITRATE 50 UG/ML
25 INJECTION, SOLUTION INTRAMUSCULAR; INTRAVENOUS
Status: DISCONTINUED | OUTPATIENT
Start: 2023-03-22 | End: 2023-03-22 | Stop reason: HOSPADM

## 2023-03-22 RX ORDER — ASPIRIN 325 MG
325 TABLET ORAL ONCE
Status: DISCONTINUED | OUTPATIENT
Start: 2023-03-22 | End: 2023-03-22 | Stop reason: HOSPADM

## 2023-03-22 RX ORDER — LIDOCAINE 40 MG/G
CREAM TOPICAL
Status: DISCONTINUED | OUTPATIENT
Start: 2023-03-22 | End: 2023-03-22 | Stop reason: HOSPADM

## 2023-03-22 RX ORDER — DIAZEPAM 5 MG
5 TABLET ORAL ONCE
Status: COMPLETED | OUTPATIENT
Start: 2023-03-22 | End: 2023-03-22

## 2023-03-22 RX ORDER — ASPIRIN 81 MG/1
243 TABLET, CHEWABLE ORAL ONCE
Status: DISCONTINUED | OUTPATIENT
Start: 2023-03-22 | End: 2023-03-22 | Stop reason: HOSPADM

## 2023-03-22 RX ORDER — METOPROLOL TARTRATE 1 MG/ML
5 INJECTION, SOLUTION INTRAVENOUS
Status: DISCONTINUED | OUTPATIENT
Start: 2023-03-22 | End: 2023-03-22 | Stop reason: HOSPADM

## 2023-03-22 RX ORDER — ASPIRIN 81 MG/1
81 TABLET ORAL DAILY
Status: DISCONTINUED | OUTPATIENT
Start: 2023-03-23 | End: 2023-03-22 | Stop reason: HOSPADM

## 2023-03-22 RX ORDER — ONDANSETRON 4 MG/1
4 TABLET, ORALLY DISINTEGRATING ORAL EVERY 6 HOURS PRN
Status: DISCONTINUED | OUTPATIENT
Start: 2023-03-22 | End: 2023-03-22 | Stop reason: HOSPADM

## 2023-03-22 RX ORDER — ATROPINE SULFATE 0.1 MG/ML
0.5 INJECTION INTRAVENOUS
Status: DISCONTINUED | OUTPATIENT
Start: 2023-03-22 | End: 2023-03-22 | Stop reason: HOSPADM

## 2023-03-22 RX ORDER — ONDANSETRON 2 MG/ML
4 INJECTION INTRAMUSCULAR; INTRAVENOUS EVERY 6 HOURS PRN
Status: DISCONTINUED | OUTPATIENT
Start: 2023-03-22 | End: 2023-03-22 | Stop reason: HOSPADM

## 2023-03-22 RX ORDER — FENTANYL CITRATE 50 UG/ML
INJECTION, SOLUTION INTRAMUSCULAR; INTRAVENOUS
Status: DISCONTINUED | OUTPATIENT
Start: 2023-03-22 | End: 2023-03-22 | Stop reason: HOSPADM

## 2023-03-22 RX ORDER — IODIXANOL 320 MG/ML
INJECTION, SOLUTION INTRAVASCULAR
Status: DISCONTINUED | OUTPATIENT
Start: 2023-03-22 | End: 2023-03-22 | Stop reason: HOSPADM

## 2023-03-22 RX ORDER — ACETAMINOPHEN 325 MG/1
650 TABLET ORAL EVERY 4 HOURS PRN
Status: DISCONTINUED | OUTPATIENT
Start: 2023-03-22 | End: 2023-03-22 | Stop reason: HOSPADM

## 2023-03-22 RX ORDER — HYDRALAZINE HYDROCHLORIDE 20 MG/ML
10 INJECTION INTRAMUSCULAR; INTRAVENOUS EVERY 4 HOURS PRN
Status: DISCONTINUED | OUTPATIENT
Start: 2023-03-22 | End: 2023-03-22 | Stop reason: HOSPADM

## 2023-03-22 RX ORDER — FLUMAZENIL 0.1 MG/ML
0.2 INJECTION, SOLUTION INTRAVENOUS
Status: DISCONTINUED | OUTPATIENT
Start: 2023-03-22 | End: 2023-03-22 | Stop reason: HOSPADM

## 2023-03-22 RX ORDER — ISOSORBIDE MONONITRATE 30 MG/1
30 TABLET, EXTENDED RELEASE ORAL DAILY
Qty: 30 TABLET | Refills: 11 | Status: SHIPPED | OUTPATIENT
Start: 2023-03-22 | End: 2023-05-04

## 2023-03-22 RX ADMIN — ASPIRIN 81 MG: 81 TABLET, CHEWABLE ORAL at 07:47

## 2023-03-22 RX ADMIN — DIAZEPAM 5 MG: 5 TABLET ORAL at 07:47

## 2023-03-22 RX ADMIN — SODIUM CHLORIDE 150 ML/HR: 9 INJECTION, SOLUTION INTRAVENOUS at 06:44

## 2023-03-22 ASSESSMENT — ACTIVITIES OF DAILY LIVING (ADL)
ADLS_ACUITY_SCORE: 35

## 2023-03-22 ASSESSMENT — EJECTION FRACTION: EF_VALUE: .29

## 2023-03-22 NOTE — H&P
HEART CARE ENCOUNTER CONSULTATON NOTE      [facility]  [phone]      Assessment/Recommendations   Assessment:    Abnormal stress test- demonstrating TID    Hx of non-obstructive CAD- last angiogram was performed in 2019; ostial LM 20%, 40-50% Cx/OM, 60% RCA mPDA    Exertional dyspnea- present over the past 9 months; progressive, may represent angina, likely multi-factorial due to progression of prior CAD, aggressive treatment for metastatic prostate CA, and possible CHRISTIANO    Hx of metastatic prostate CA- s/p chemo/radiation    Possible CHRISTIANO- hx of snoring without formal sleep study    PLAN:    Coronary angiogram with possible PCI    Risks/benfits/expected outcomes were discussed with the patient who verbalizes understanding and wishes to proceed    Risks discussed for diagnostic evaluation with coronary angiogram include but are not limited to the following: less than 0.1% of acute myocardial infarction and CVA or death or any of the following to the degree that it could threaten life: allergic reaction, arrhythmia, renal failure, hemorrhage, thrombosis and infection    Risks discussed for therapeutic interventions related to PTCA, atherectomy and stent include but are not limited to the followin% or less risk of MI, less than 1% risk of CVA, emergency heart surgery, death, less than 4 % risk of vascular injury requiring surgical repair or blood transfusion, 20-30% risk of re-stenosis with a bare metal stent, less than 10% risk of re-stonosis with a drug-eluting stent, 0.5-1% chance of stent thrombosis and may need clopidogrel antiplatelet therapy additional to ASA for greater than 1 year.     Patient encouraged to have outpatient sleep study     Further recommendations pending angiographic results    Thank you for the opportunity to participate in the care of this patient       History of Present Illness/Subjective    HPI: Buddy Branch is a 72 year old male with past medical history of  "non-obstructive CAD and metastatic prostate CA; s/p chemo and XRT who presents for elective coronary angiogram. The patient recently underwent NM stress testing in evaluation of progressive exertional dyspnea which has been present over the past 9 months, which revealed abnormal findings which are listed below.     Buddy reports occasional palpitations. He denies chest pain, pressure, dyspnea at rest, dizziness, lightheadedness, syncope or near syncope. No cough, orthopnea or PND.     NM LEXISCAN  3/9/2023     The nuclear stress test is abnormal. There are no perfusion defects.     Stress to rest cavity ratio is 1.36.  TID is present visually and quantitatively. TID is non-specific but can be associted with multivessel disease.     The left ventricular ejection fraction at stress is 59%.     A prior study was conducted on 2/12/2021.  This study has changes noted when compared with the prior study. TID is now present    ECHOCARDIOGRAM 2/12/2021  Narrative & Impression  1. Normal left ventricular size and systolic performance with a visually estimated ejection fraction of 55%.   2. There is mild concentric increase in left ventricular wall thickness.   3. No significant valvular heart disease is identified on this study.   4. Normal right ventricular size and systolic performance.   5. There is mild enlargement of the aortic root.    CORONARY ANGIOGRAM 8/28/2019  Conclusion      RPDA lesion is 60% stenosed.    1st Mrg lesion is 40% stenosed.    The LM vessel was moderate.    Estimated blood loss was <20 ml.    The LAD vessel was moderate .     Physical Examination  Review of Systems   Vitals: BP (!) 146/84 (BP Location: Left arm)   Pulse 69   Temp 98.1  F (36.7  C) (Oral)   Ht 1.727 m (5' 8\")   Wt 83.9 kg (185 lb)   SpO2 95%   BMI 28.13 kg/m    BMI= Body mass index is 28.13 kg/m .  Wt Readings from Last 3 Encounters:   03/22/23 83.9 kg (185 lb)   02/10/22 87.9 kg (193 lb 11.2 oz)   08/20/20 85.3 kg (188 lb) "       General Appearance:   no distress, normal body habitus   ENT/Mouth: membranes moist, no oral lesions or bleeding gums.      EYES:  no scleral icterus, normal conjunctivae   Neck: no carotid bruits or thyromegaly   Chest/Lungs:   lungs are clear to auscultation, no rales or wheezing, equal chest wall expansion    Cardiovascular:   Regular. Normal first and second heart sounds with no murmurs, rubs, or gallops; the carotid, radial and posterior tibial pulses are intact, Jugular venous pressure not elevated, no edema.   Abdomen:  no organomegaly, masses, bruits, or tenderness; bowel sounds are present   Extremities: no cyanosis or clubbing   Skin: no xanthelasma, warm.    Neurologic: normal  bilateral, no tremors     Psychiatric: alert and oriented x3, calm        Please refer above for cardiac ROS details.   Review Of Systems  Skin: negative for, rash, bruising  Eyes: negative for, visual blurring, double vision  Ears/Nose/Throat: negative for, epistaxis  Respiratory: See HPI.   Cardiovascular: See HPI  Gastrointestinal: positive for constipation and diarrhea. Negative for abdominal pain or hematochezia  Genitourinary: positive fordecreased urinary stream. Negative for hematuria or dysuria.  Musculoskeletal: negative for, joint pain, joint swelling, joint stiffness and muscular weakness  Neurologic: negative for, numbness or tingling of hands and numbness or tingling of feet  Psychiatric: negative for, thoughts of self-harm and thoughts of hurting someone else  Hematologic/Lymphatic/Immunologic: negative for, chills or fever  Endocrine: negative for and night sweats       Medical History  Surgical History Family History Social History   Past Medical History:   Diagnosis Date     Coronary artery disease      Hyperlipidemia      MI (myocardial infarction) (H)      PVC (premature ventricular contraction)      Past Surgical History:   Procedure Laterality Date     ANGIOPLASTY       CARDIAC CATHETERIZATION        CORONARY ANGIOPLASTY       CV CORONARY ANGIOGRAM N/A 8/28/2019    Procedure: Coronary Angiogram;  Surgeon: Odilon Roman MD;  Location: [facility] Cath Lab;  Service: Cardiology     Family History   Problem Relation Age of Onset     Coronary Artery Disease Mother         Social History     Socioeconomic History     Marital status:      Spouse name: Not on file     Number of children: Not on file     Years of education: Not on file     Highest education level: Not on file   Occupational History     Not on file   Tobacco Use     Smoking status: Former     Smokeless tobacco: Never   Substance and Sexual Activity     Alcohol use: Yes     Alcohol/week: 10.0 - 15.0 standard drinks     Drug use: Never     Sexual activity: Not on file   Other Topics Concern     Not on file   Social History Narrative     Not on file     Social Determinants of Health     Financial Resource Strain: Not on file   Food Insecurity: Not on file   Transportation Needs: Not on file   Physical Activity: Not on file   Stress: Not on file   Social Connections: Not on file   Intimate Partner Violence: Not on file   Housing Stability: Not on file           Medications  Allergies   No current outpatient medications on file.       Allergies   Allergen Reactions     Ace Inhibitors Cough     Metformin Diarrhea          Lab Results    Chemistry/lipid CBC Cardiac Enzymes/BNP/TSH/INR   Recent Labs   Lab Test 03/22/23  0639   CHOL 191   HDL 40   LDL 77   TRIG 369*     Recent Labs   Lab Test 03/22/23  0639 04/26/16  1438 11/03/15  1209   LDL 77 89 62     Recent Labs   Lab Test 03/21/23  1151      POTASSIUM 4.9   CHLORIDE 101   CO2 22   *   BUN 20.7   CR 0.99   GFRESTIMATED 82   KHANH 9.4     Recent Labs   Lab Test 03/21/23  1151 10/02/19  0657 09/30/19  0827   CR 0.99 0.87 0.92     No results for input(s): A1C in the last 49653 hours.       Recent Labs   Lab Test 03/21/23  1151   WBC 6.0   HGB 13.7   HCT 39.4*   MCV 94         Recent Labs   Lab Test 03/21/23  1151 10/02/19  0657 09/30/19  0827   HGB 13.7 10.4* 9.8*    No results for input(s): TROPONINI in the last 71039 hours.  No results for input(s): BNP, NTBNPI, NTBNP in the last 20590 hours.  No results for input(s): TSH in the last 63049 hours.  No results for input(s): INR in the last 50152 hours.   .

## 2023-03-22 NOTE — PLAN OF CARE
Pt c/o shortness of breath. Denies chest pain. States he had a stress test recently. Pt is hopeful for a solution. Wife Pura at bedside. Ready for procedure.

## 2023-03-22 NOTE — INTERVAL H&P NOTE
"I have reviewed the surgical (or preoperative) H&P that is linked to this encounter, and examined the patient. There are no significant changes    Clinical Conditions Present on Arrival:  Clinically Significant Risk Factors Present on Admission                    # Overweight: Estimated body mass index is 28.13 kg/m  as calculated from the following:    Height as of this encounter: 1.727 m (5' 8\").    Weight as of this encounter: 83.9 kg (185 lb).       "

## 2023-03-22 NOTE — PROGRESS NOTES
Lab results demonstrate hypertriglyceridemia; 369, elevated non . Total cholesterol 191. HDL 40. LDL 77.

## 2023-03-22 NOTE — DISCHARGE INSTRUCTIONS

## 2023-03-22 NOTE — PRE-PROCEDURE
GENERAL PRE-PROCEDURE:   Procedure:  Coronary angiogram with possible PCI  Date/Time:  3/22/2023 8:12 AM    Written consent obtained?: Yes    Risks and benefits: Risks, benefits and alternatives were discussed    Consent given by:  Patient  Patient states understanding of procedure being performed: Yes    Patient's understanding of procedure matches consent: Yes    Procedure consent matches procedure scheduled: Yes    Expected level of sedation:  Moderate  Appropriately NPO:  Yes  ASA Class:  3 (dyspnea, abnormal stress test, CAD, probable CHRISTIANO)  Mallampati  :  Grade 3- soft palate visible, posterior pharyngeal wall not visible  Lungs:  Lungs clear with good breath sounds bilaterally  Heart:  Normal heart sounds and rate  History & Physical reviewed:  History and physical reviewed and no updates needed  Statement of review:  I have reviewed the lab findings, diagnostic data, medications, and the plan for sedation

## 2023-05-04 ENCOUNTER — OFFICE VISIT (OUTPATIENT)
Dept: CARDIOLOGY | Facility: CLINIC | Age: 73
End: 2023-05-04
Payer: MEDICARE

## 2023-05-04 VITALS
OXYGEN SATURATION: 97 % | RESPIRATION RATE: 14 BRPM | DIASTOLIC BLOOD PRESSURE: 67 MMHG | HEART RATE: 75 BPM | HEIGHT: 68 IN | BODY MASS INDEX: 28.89 KG/M2 | WEIGHT: 190.6 LBS | SYSTOLIC BLOOD PRESSURE: 120 MMHG

## 2023-05-04 DIAGNOSIS — I25.10 ATHEROSCLEROSIS OF NATIVE CORONARY ARTERY OF NATIVE HEART WITHOUT ANGINA PECTORIS: Primary | ICD-10-CM

## 2023-05-04 DIAGNOSIS — R06.09 DOE (DYSPNEA ON EXERTION): ICD-10-CM

## 2023-05-04 PROCEDURE — 99215 OFFICE O/P EST HI 40 MIN: CPT | Performed by: INTERNAL MEDICINE

## 2023-05-04 RX ORDER — ISOSORBIDE MONONITRATE 30 MG/1
60 TABLET, EXTENDED RELEASE ORAL DAILY
Qty: 30 TABLET | Refills: 11 | Status: SHIPPED | OUTPATIENT
Start: 2023-05-04 | End: 2023-05-12

## 2023-05-04 NOTE — LETTER
5/4/2023    Felisa Knott NP  [facility] [address] [facility] [address]    RE: Buddy Branch       Dear Colleague,     I had the pleasure of seeing Buddy Branch in the [facility].      [facility]  [phone]          Assessment/Recommendations   Patient with known coronary artery disease as well as a advanced prostate cancer.  Recent angiogram showed significant blockages in very technically difficult areas and percutaneous intervention was not accomplished.  His losartan was increased and he was added isosorbide mononitrate and he actually feels better.  Some days he feels quite good such as today.  Other days he is a little bit more fatigued.  Breathing is definitely better with physical activity.    We talked about various strategies.  We could review his angiogram with our interventional team to see if there are any tools that might be available to make this procedure of successful.  I will do that.  We talked about the possibility of bypass surgery which I think would be a big project for him given his advanced prostate cancer but certainly not out of the question.    We decided to increase his isosorbide to 60 mg a day and let us know in a couple weeks how he is doing from a functional capacity standpoint.  Overall my sense that he is doing reasonably well given his overall situation.    Thank you for allowing us to participate in his care.  40 minutes spent with chart review, patient visit, and documentation.     History of Present Illness/Subjective    Mr. Buddy Branch is a 72 year old male with known coronary artery disease and advanced prostate cancer.  He was having significant amount of shortness of breath with activity and nuclear exercise test showed transient dilatation.  Left ventricular ejection fraction is normal at 59%.  The test was done pharmacologically.  He underwent coronary angiography because of his significant symptoms and abnormal  "stress test.  He had severe lesions in 2 branch diagonal vessels 1 of which was a reasonable size.  He also had moderate lesions OM 2 in the distal circumflex as well as moderate to severe disease in the distal right coronary artery.  Attempts were made to open up the diagonal vessel in the distal RCA lesion were unsuccessful.  His losartan was increased and his medicines were changed with the addition of isosorbide mononitrate 30 mg a day.  This is resulted in improvement in his shortness of breath with exertion.  Some days he still quite fatigued but other days he feels quite good.  He has not had any chest discomfort except for fleeting chest discomfort while resting which is not angina.  He denies orthopnea, paroxysmal nocturnal dyspnea, peripheral edema, syncope or near syncopal episodes.        ECG: Personally reviewed.        Physical Examination Review of Systems   /67 (BP Location: Left arm, Patient Position: Sitting, Cuff Size: Adult Large)   Pulse 75   Resp 14   Ht 1.727 m (5' 8\")   Wt 86.5 kg (190 lb 9.6 oz)   SpO2 97%   BMI 28.98 kg/m    Body mass index is 28.98 kg/m .  Wt Readings from Last 3 Encounters:   05/04/23 86.5 kg (190 lb 9.6 oz)   03/22/23 83.9 kg (185 lb)   02/10/22 87.9 kg (193 lb 11.2 oz)     General Appearance:   Alert, cooperative and in no acute distress.   ENT/Mouth: Pink/moist oral mucosa   EYES:  no scleral icterus, normal conjunctivae   Neck: JVP normal. No Hepatojugular reflux. Thyroid not visualized.   Chest/Lungs:   Lungs are clear to auscultation, equal chest wall expansion.   Cardiovascular:   S1, S2 without murmur ,clicks or rubs. Brachial, radial  pulses are intact and symetric. No carotid bruits noted   Abdomen:  Nontender. BS+.    Extremities: No cyanosis, clubbing or edema   Skin: no xanthelasma, warm.    Neurologic: normal arm movement bilateral, no tremors     Psychiatric: Appropriate affect.      Enc Vitals  BP: 120/67  Pulse: 75  Resp: 14  SpO2: 97 " "%  Weight: 86.5 kg (190 lb 9.6 oz)  Height: 172.7 cm (5' 8\")                                           Medical History  Surgical History Family History Social History   Past Medical History:   Diagnosis Date    Coronary artery disease     Hyperlipidemia     MI (myocardial infarction) (H)     PVC (premature ventricular contraction)     Past Surgical History:   Procedure Laterality Date    ANGIOPLASTY      CARDIAC CATHETERIZATION      CORONARY ANGIOPLASTY      CV CORONARY ANGIOGRAM N/A 8/28/2019    Procedure: Coronary Angiogram;  Surgeon: Odilon Roman MD;  Location: [facility] Cath Lab;  Service: Cardiology    CV CORONARY ANGIOGRAM N/A 3/22/2023    Procedure: Coronary Angiogram;  Surgeon: Elida Varghese MD;  Location: [facility] CATH LAB CV    CV LEFT HEART CATH N/A 3/22/2023    Procedure: Left Heart Catheterization;  Surgeon: Elida Varghese MD;  Location: [facility] LAB CV    CV PCI N/A 3/22/2023    Procedure: Percutaneous Coronary Intervention;  Surgeon: Elida Varghese MD;  Location: [facility] LAB CV    Family History   Problem Relation Age of Onset    Coronary Artery Disease Mother     Social History     Socioeconomic History    Marital status:      Spouse name: Not on file    Number of children: Not on file    Years of education: Not on file    Highest education level: Not on file   Occupational History    Not on file   Tobacco Use    Smoking status: Former    Smokeless tobacco: Never   Vaping Use    Vaping status: Never Used   Substance and Sexual Activity    Alcohol use: Yes     Alcohol/week: 10.0 - 15.0 standard drinks of alcohol    Drug use: Never    Sexual activity: Not Currently     Partners: Female   Other Topics Concern    Not on file   Social History Narrative    Not on file     Social Determinants of Health     Financial Resource Strain: Not on file   Food Insecurity: Not on file   Transportation Needs: Not on file   Physical Activity: Not on file   Stress: Not on file "   Social Connections: Not on file   Intimate Partner Violence: Not on file   Housing Stability: Not on file          Medications  Allergies   Current Outpatient Medications   Medication Sig Dispense Refill    aspirin (ASA) 81 MG chewable tablet Take 1 tablet (81 mg) by mouth daily Starting tomorrow. 30 tablet 3    aspirin (ASA) 81 MG EC tablet Take 81 mg by mouth daily      betamethasone valerate (VALISONE) 0.1 % ointment [BETAMETHASONE VALERATE (VALISONE) 0.1 % OINTMENT] Apply 1 application topically 2 (two) times a day as needed.      blood glucose (CONTOUR NEXT TEST) test strip Dispense item covered by pt ins. E11.9 NIDDM type II - Test 1 time/day      docusate sodium (COLACE) 50 MG capsule [DOCUSATE SODIUM (COLACE) 50 MG CAPSULE] Take by mouth 2 (two) times a day as needed for constipation.      isosorbide mononitrate (IMDUR) 30 MG 24 hr tablet Take 2 tablets (60 mg) by mouth daily 30 tablet 11    losartan (COZAAR) 25 MG tablet [LOSARTAN (COZAAR) 25 MG TABLET] TAKE 1 TABLET (25 MG TOTAL) BY MOUTH DAILY. 90 tablet 1    metoprolol succinate (TOPROL-XL) 100 MG 24 hr tablet [METOPROLOL SUCCINATE (TOPROL-XL) 100 MG 24 HR TABLET] TAKE 1 TABLET (100 MG TOTAL) BY MOUTH DAILY. 90 tablet 1    morphine 10 MG/5ML solution as needed      neomycin-bacitracin-polymyxin (NEOSPORIN) ointment [NEOMYCIN-BACITRACIN-POLYMYXIN (NEOSPORIN) OINTMENT] Apply to penis at catheter insertion site three times a day while catheter is in place. 15 g 0    nitroglycerin (NITROSTAT) 0.4 MG SL tablet [NITROGLYCERIN (NITROSTAT) 0.4 MG SL TABLET] Place 0.4 mg under the tongue every 5 (five) minutes as needed for chest pain.      omeprazole (PRILOSEC) 20 MG capsule [OMEPRAZOLE (PRILOSEC) 20 MG CAPSULE] Take 20 mg by mouth as needed (usually takes about 1x week).       ondansetron (ZOFRAN-ODT) 4 MG disintegrating tablet [ONDANSETRON (ZOFRAN-ODT) 4 MG DISINTEGRATING TABLET] Take 4 mg by mouth.      rosuvastatin (CRESTOR) 40 MG tablet Take 1 tablet (40  mg) by mouth At Bedtime 90 tablet 3    Allergies   Allergen Reactions    Ace Inhibitors Cough    Metformin Diarrhea         Lab Results    Chemistry/lipid CBC Cardiac Enzymes/BNP/TSH/INR   Lab Results   Component Value Date    CHOL 191 03/22/2023    HDL 40 03/22/2023    TRIG 369 (H) 03/22/2023    BUN 20.7 03/21/2023     03/21/2023    CO2 22 03/21/2023    Lab Results   Component Value Date    WBC 6.0 03/21/2023    HGB 13.7 03/21/2023    HCT 39.4 (L) 03/21/2023    MCV 94 03/21/2023     03/21/2023    No results found for: CKTOTAL, CKMB, TROPONINI, BNP, TSH, INR                                            Thank you for allowing me to participate in the care of your patient.      Sincerely,     Fawad Andres MD     [facility]  cc:   No referring provider defined for this encounter.

## 2023-05-04 NOTE — PROGRESS NOTES
[facility]  [phone]          Assessment/Recommendations   Patient with known coronary artery disease as well as a advanced prostate cancer.  Recent angiogram showed significant blockages in very technically difficult areas and percutaneous intervention was not accomplished.  His losartan was increased and he was added isosorbide mononitrate and he actually feels better.  Some days he feels quite good such as today.  Other days he is a little bit more fatigued.  Breathing is definitely better with physical activity.    We talked about various strategies.  We could review his angiogram with our interventional team to see if there are any tools that might be available to make this procedure of successful.  I will do that.  We talked about the possibility of bypass surgery which I think would be a big project for him given his advanced prostate cancer but certainly not out of the question.    We decided to increase his isosorbide to 60 mg a day and let us know in a couple weeks how he is doing from a functional capacity standpoint.  Overall my sense that he is doing reasonably well given his overall situation.    Thank you for allowing us to participate in his care.  40 minutes spent with chart review, patient visit, and documentation.     History of Present Illness/Subjective    Mr. Buddy Branch is a 72 year old male with known coronary artery disease and advanced prostate cancer.  He was having significant amount of shortness of breath with activity and nuclear exercise test showed transient dilatation.  Left ventricular ejection fraction is normal at 59%.  The test was done pharmacologically.  He underwent coronary angiography because of his significant symptoms and abnormal stress test.  He had severe lesions in 2 branch diagonal vessels 1 of which was a reasonable size.  He also had moderate lesions OM 2 in the distal circumflex as well as moderate to severe disease in the distal  "right coronary artery.  Attempts were made to open up the diagonal vessel in the distal RCA lesion were unsuccessful.  His losartan was increased and his medicines were changed with the addition of isosorbide mononitrate 30 mg a day.  This is resulted in improvement in his shortness of breath with exertion.  Some days he still quite fatigued but other days he feels quite good.  He has not had any chest discomfort except for fleeting chest discomfort while resting which is not angina.  He denies orthopnea, paroxysmal nocturnal dyspnea, peripheral edema, syncope or near syncopal episodes.        ECG: Personally reviewed.        Physical Examination Review of Systems   /67 (BP Location: Left arm, Patient Position: Sitting, Cuff Size: Adult Large)   Pulse 75   Resp 14   Ht 1.727 m (5' 8\")   Wt 86.5 kg (190 lb 9.6 oz)   SpO2 97%   BMI 28.98 kg/m    Body mass index is 28.98 kg/m .  Wt Readings from Last 3 Encounters:   05/04/23 86.5 kg (190 lb 9.6 oz)   03/22/23 83.9 kg (185 lb)   02/10/22 87.9 kg (193 lb 11.2 oz)     General Appearance:   Alert, cooperative and in no acute distress.   ENT/Mouth: Pink/moist oral mucosa   EYES:  no scleral icterus, normal conjunctivae   Neck: JVP normal. No Hepatojugular reflux. Thyroid not visualized.   Chest/Lungs:   Lungs are clear to auscultation, equal chest wall expansion.   Cardiovascular:   S1, S2 without murmur ,clicks or rubs. Brachial, radial  pulses are intact and symetric. No carotid bruits noted   Abdomen:  Nontender. BS+.    Extremities: No cyanosis, clubbing or edema   Skin: no xanthelasma, warm.    Neurologic: normal arm movement bilateral, no tremors     Psychiatric: Appropriate affect.      Enc Vitals  BP: 120/67  Pulse: 75  Resp: 14  SpO2: 97 %  Weight: 86.5 kg (190 lb 9.6 oz)  Height: 172.7 cm (5' 8\")                                           Medical History  Surgical History Family History Social History   Past Medical History:   Diagnosis Date     " Coronary artery disease      Hyperlipidemia      MI (myocardial infarction) (H)      PVC (premature ventricular contraction)     Past Surgical History:   Procedure Laterality Date     ANGIOPLASTY       CARDIAC CATHETERIZATION       CORONARY ANGIOPLASTY       CV CORONARY ANGIOGRAM N/A 8/28/2019    Procedure: Coronary Angiogram;  Surgeon: Odilon Roman MD;  Location: [facility] Cath Lab;  Service: Cardiology     CV CORONARY ANGIOGRAM N/A 3/22/2023    Procedure: Coronary Angiogram;  Surgeon: Elida Varghese MD;  Location: [facility] CATH LAB CV     CV LEFT HEART CATH N/A 3/22/2023    Procedure: Left Heart Catheterization;  Surgeon: Elida Varghese MD;  Location: [facility] LAB CV     CV PCI N/A 3/22/2023    Procedure: Percutaneous Coronary Intervention;  Surgeon: Elida Varghese MD;  Location: [facility] CV    Family History   Problem Relation Age of Onset     Coronary Artery Disease Mother     Social History     Socioeconomic History     Marital status:      Spouse name: Not on file     Number of children: Not on file     Years of education: Not on file     Highest education level: Not on file   Occupational History     Not on file   Tobacco Use     Smoking status: Former     Smokeless tobacco: Never   Vaping Use     Vaping status: Never Used   Substance and Sexual Activity     Alcohol use: Yes     Alcohol/week: 10.0 - 15.0 standard drinks of alcohol     Drug use: Never     Sexual activity: Not Currently     Partners: Female   Other Topics Concern     Not on file   Social History Narrative     Not on file     Social Determinants of Health     Financial Resource Strain: Not on file   Food Insecurity: Not on file   Transportation Needs: Not on file   Physical Activity: Not on file   Stress: Not on file   Social Connections: Not on file   Intimate Partner Violence: Not on file   Housing Stability: Not on file          Medications  Allergies   Current Outpatient Medications   Medication Sig  Dispense Refill     aspirin (ASA) 81 MG chewable tablet Take 1 tablet (81 mg) by mouth daily Starting tomorrow. 30 tablet 3     aspirin (ASA) 81 MG EC tablet Take 81 mg by mouth daily       betamethasone valerate (VALISONE) 0.1 % ointment [BETAMETHASONE VALERATE (VALISONE) 0.1 % OINTMENT] Apply 1 application topically 2 (two) times a day as needed.       blood glucose (CONTOUR NEXT TEST) test strip Dispense item covered by pt ins. E11.9 NIDDM type II - Test 1 time/day       docusate sodium (COLACE) 50 MG capsule [DOCUSATE SODIUM (COLACE) 50 MG CAPSULE] Take by mouth 2 (two) times a day as needed for constipation.       isosorbide mononitrate (IMDUR) 30 MG 24 hr tablet Take 2 tablets (60 mg) by mouth daily 30 tablet 11     losartan (COZAAR) 25 MG tablet [LOSARTAN (COZAAR) 25 MG TABLET] TAKE 1 TABLET (25 MG TOTAL) BY MOUTH DAILY. 90 tablet 1     metoprolol succinate (TOPROL-XL) 100 MG 24 hr tablet [METOPROLOL SUCCINATE (TOPROL-XL) 100 MG 24 HR TABLET] TAKE 1 TABLET (100 MG TOTAL) BY MOUTH DAILY. 90 tablet 1     morphine 10 MG/5ML solution as needed       neomycin-bacitracin-polymyxin (NEOSPORIN) ointment [NEOMYCIN-BACITRACIN-POLYMYXIN (NEOSPORIN) OINTMENT] Apply to penis at catheter insertion site three times a day while catheter is in place. 15 g 0     nitroglycerin (NITROSTAT) 0.4 MG SL tablet [NITROGLYCERIN (NITROSTAT) 0.4 MG SL TABLET] Place 0.4 mg under the tongue every 5 (five) minutes as needed for chest pain.       omeprazole (PRILOSEC) 20 MG capsule [OMEPRAZOLE (PRILOSEC) 20 MG CAPSULE] Take 20 mg by mouth as needed (usually takes about 1x week).        ondansetron (ZOFRAN-ODT) 4 MG disintegrating tablet [ONDANSETRON (ZOFRAN-ODT) 4 MG DISINTEGRATING TABLET] Take 4 mg by mouth.       rosuvastatin (CRESTOR) 40 MG tablet Take 1 tablet (40 mg) by mouth At Bedtime 90 tablet 3    Allergies   Allergen Reactions     Ace Inhibitors Cough     Metformin Diarrhea         Lab Results    Chemistry/lipid CBC Cardiac  Enzymes/BNP/TSH/INR   Lab Results   Component Value Date    CHOL 191 03/22/2023    HDL 40 03/22/2023    TRIG 369 (H) 03/22/2023    BUN 20.7 03/21/2023     03/21/2023    CO2 22 03/21/2023    Lab Results   Component Value Date    WBC 6.0 03/21/2023    HGB 13.7 03/21/2023    HCT 39.4 (L) 03/21/2023    MCV 94 03/21/2023     03/21/2023    No results found for: CKTOTAL, CKMB, TROPONINI, BNP, TSH, INR

## 2023-05-12 DIAGNOSIS — R06.09 DOE (DYSPNEA ON EXERTION): ICD-10-CM

## 2023-05-12 DIAGNOSIS — I25.10 ATHEROSCLEROSIS OF NATIVE CORONARY ARTERY OF NATIVE HEART WITHOUT ANGINA PECTORIS: ICD-10-CM

## 2023-05-12 RX ORDER — ISOSORBIDE MONONITRATE 30 MG/1
60 TABLET, EXTENDED RELEASE ORAL DAILY
Qty: 30 TABLET | Refills: 11 | Status: SHIPPED | OUTPATIENT
Start: 2023-05-12

## 2023-05-24 ENCOUNTER — TELEPHONE (OUTPATIENT)
Dept: CARDIOLOGY | Facility: CLINIC | Age: 73
End: 2023-05-24
Payer: MEDICARE

## 2023-05-24 NOTE — TELEPHONE ENCOUNTER
----- Message -----  From: Elida Varghese MD  Sent: 5/19/2023  12:55 PM CDT  To: Fawad Andres MD    It was just me. So I talked and there were no replies. If you think he needs his RCA tried again we can try from the groin approach and see if anything goes this time.     EG      ----- Message -----  From: Fawad Andres MD  Sent: 5/19/2023   8:15 AM CDT  To: Elida Varghese MD; Yuval White MD; #    Elida,    Was this case discussed at cath conference?    Thanks,    Fawad  ----- Message -----  From: Alan Zamudio RN  Sent: 5/16/2023   9:50 AM CDT  To: Fawad Andres MD; Elida Varghese MD; #    I will add him to the cath conference list.  ----- Message -----  From: Elida Varghese MD  Sent: 5/5/2023   8:40 AM CDT  To: Fawad Andres MD; Yuval White MD; #    We can talk about him at cath conference next week. The diagonal is too small. Could retry the right via a groin approach but the risk of causing damage to that right is very high. It is very tortuous and calcified. Has a pulmonary cause of his dyspnea been ruled out?    EG    ----- Message -----  From: Fawad Andres MD  Sent: 5/4/2023   4:24 PM CDT  To: Elida Varghese MD; Yuval White MD; #    Elida,    This is a patient who did an angio on a he had some diagonal disease and distal right coronary disease which was not very approachable from percutaneous intervention despite attempts.  He still has shortness of breath with activity although it is better with medical therapy.    Would his angio be something that could be shown at cath conference to see if anybody had any ideas or if you had any other ideas after being in there for percutaneous intervention.  He does have advanced prostate cancer although there is no evidence of current disease so I think bypass surgery would be of a big deal for him.    Appreciate your thoughts.    Thanks,    Fawad

## 2023-05-25 NOTE — TELEPHONE ENCOUNTER
----- Message from Fawad Andres MD sent at 5/22/2023  7:28 AM CDT -----  Shantel, could you see this patient is doing?  He is got some other medical issues which could be contributing to his fatigue.  If he is very frustrated with his current functional capacity, I can do a virtual visit with him and we can talk about trying to open up his right coronary artery.  If he is doing reasonably good, I would not push it.    Thanks,    Fawad      ==  Return call received from patient. He notes he feels reasonably well at this time with only occasional fatigue and shortness of breath. Patient okay with continuing medical therapy only at this time but will be sure to reach out to us if something changes.   NAOMI Andres. -claudetteb

## 2023-05-26 NOTE — TELEPHONE ENCOUNTER
----- Message -----  From: Fawad Andres MD  Sent: 5/25/2023   5:18 PM CDT  To: Shantel Cabezas RN    Thank you

## 2024-03-03 ENCOUNTER — HEALTH MAINTENANCE LETTER (OUTPATIENT)
Age: 74
End: 2024-03-03

## 2024-09-03 ENCOUNTER — OFFICE VISIT (OUTPATIENT)
Dept: CARDIOLOGY | Facility: CLINIC | Age: 74
End: 2024-09-03
Payer: MEDICARE

## 2024-09-03 VITALS
BODY MASS INDEX: 27.83 KG/M2 | RESPIRATION RATE: 14 BRPM | WEIGHT: 183 LBS | HEART RATE: 76 BPM | DIASTOLIC BLOOD PRESSURE: 73 MMHG | SYSTOLIC BLOOD PRESSURE: 126 MMHG

## 2024-09-03 DIAGNOSIS — R53.82 CHRONIC FATIGUE: ICD-10-CM

## 2024-09-03 DIAGNOSIS — I10 ESSENTIAL HYPERTENSION: ICD-10-CM

## 2024-09-03 DIAGNOSIS — I49.3 FREQUENT PVCS: Primary | ICD-10-CM

## 2024-09-03 DIAGNOSIS — I25.10 ATHEROSCLEROSIS OF NATIVE CORONARY ARTERY OF NATIVE HEART WITHOUT ANGINA PECTORIS: ICD-10-CM

## 2024-09-03 LAB
ERYTHROCYTE [DISTWIDTH] IN BLOOD BY AUTOMATED COUNT: 16.6 % (ref 10–15)
HCT VFR BLD AUTO: 33.3 % (ref 40–53)
HGB BLD-MCNC: 10.7 G/DL (ref 13.3–17.7)
MCH RBC QN AUTO: 27.4 PG (ref 26.5–33)
MCHC RBC AUTO-ENTMCNC: 32.1 G/DL (ref 31.5–36.5)
MCV RBC AUTO: 85 FL (ref 78–100)
PLATELET # BLD AUTO: 174 10E3/UL (ref 150–450)
RBC # BLD AUTO: 3.91 10E6/UL (ref 4.4–5.9)
TSH SERPL DL<=0.005 MIU/L-ACNC: 1.59 UIU/ML (ref 0.3–4.2)
WBC # BLD AUTO: 5.1 10E3/UL (ref 4–11)

## 2024-09-03 PROCEDURE — 85027 COMPLETE CBC AUTOMATED: CPT | Performed by: INTERNAL MEDICINE

## 2024-09-03 PROCEDURE — 36415 COLL VENOUS BLD VENIPUNCTURE: CPT | Performed by: INTERNAL MEDICINE

## 2024-09-03 PROCEDURE — 99214 OFFICE O/P EST MOD 30 MIN: CPT | Performed by: INTERNAL MEDICINE

## 2024-09-03 PROCEDURE — 84443 ASSAY THYROID STIM HORMONE: CPT | Performed by: INTERNAL MEDICINE

## 2024-09-03 RX ORDER — ASCORBIC ACID
25 CRYSTALS ORAL DAILY
COMMUNITY

## 2024-09-03 RX ORDER — VITAMIN B COMPLEX
5000 TABLET ORAL DAILY
COMMUNITY

## 2024-09-03 NOTE — LETTER
9/3/2024    Felisa Knott NP  [address] [facility] [address]    RE: Buddy Branch       Dear Colleague,     I had the pleasure of seeing Buddy Branch in the [facility].      [facility]  [phone]          Assessment/Recommendations   Patient with known coronary artery disease with lesions in the right coronary artery distally as well as diagonal vessel.  Attempts at percutaneous intervention on these lesions was unsuccessful due to tortuosity of the vessels.    Patient also has a history of premature ventricular contractions and is having more palpitations and feels like every other day he gets an increase in palpitations which makes him quite fatigued.  He feels like every other day he is exhausted.    We will place a monitor to see if we can correlate symptoms with any rhythm disturbances and also do an echocardiogram to ensure he has not developed any reduction left ventricular systolic function.  We will also check a CBC and TSH today.    Will get back to him with the results of his studies and any further recommendations.       History of Present Illness/Subjective    Mr. Buddy Branch is a 73 year old male with known coronary artery disease as described above.  He has not had anginal symptoms but overall has quite a contrast in the way he feels day-to-day.  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  Another days he feels completely exhausted and sometimes to the point where he feels like to get a lift on the breath, but that is appropriate.  Other states that he is exhausted he does also feel more palpitations.  He has tried going up a bit on his metoprolol to 200 mg a day which seems to help with palpitations but he wonders if it contributes to some diarrhea.  He denies orthopnea, paroxysmal nocturnal dyspnea, peripheral edema or syncopal episodes.           Physical Examination  Review of Systems   /73 (BP Location: Right arm, Patient Position: Sitting, Cuff Size: Adult Regular)   Pulse 76   Resp 14   Wt 83 kg (183 lb)   BMI 27.83 kg/m    Body mass index is 27.83 kg/m .  Wt Readings from Last 3 Encounters:   09/03/24 83 kg (183 lb)   05/04/23 86.5 kg (190 lb 9.6 oz)   03/22/23 83.9 kg (185 lb)     General Appearance:   Alert, cooperative and in no acute distress.   ENT/Mouth: Pink/moist oral mucosa   EYES:  no scleral icterus, normal conjunctivae   Neck: JVP normal. No Hepatojugular reflux. Thyroid not visualized.   Chest/Lungs:   Lungs are clear to auscultation, equal chest wall expansion.   Cardiovascular:   S1, S2 without murmur ,clicks or rubs. Brachial, radial  pulses are intact and symetric. No carotid bruits noted.  Regular rhythm   Abdomen:  Nontender. BS+. No bruits.   Extremities: No cyanosis, clubbing or edema   Skin: no xanthelasma, warm.    Neurologic: normal arm movement bilateral, no tremors     Psychiatric: Appropriate affect.      Enc Vitals  BP: 126/73  Pulse: 76  Resp: 14  Weight: 83 kg (183 lb)                                           Medical History  Surgical History Family History Social History   Past Medical History:   Diagnosis Date     Coronary artery disease      Hyperlipidemia      MI (myocardial infarction) (H)      PVC (premature ventricular contraction)     Past Surgical History:   Procedure Laterality Date     ANGIOPLASTY       CARDIAC CATHETERIZATION       CORONARY ANGIOPLASTY       CV CORONARY ANGIOGRAM N/A 8/28/2019    Procedure: Coronary Angiogram;  Surgeon: Odilon Roman MD;  Location: [facility] Cath Lab;  Service: Cardiology     CV CORONARY ANGIOGRAM N/A 3/22/2023    Procedure: Coronary Angiogram;  Surgeon: Elida Varghese MD;  Location: [facility] CATH LAB CV     CV LEFT HEART CATH N/A 3/22/2023    Procedure: Left Heart Catheterization;  Surgeon: Elida Varghese MD;  Location: [facility] LAB CV     CV PCI N/A 3/22/2023     Procedure: Percutaneous Coronary Intervention;  Surgeon: Elida Varghese MD;  Location: [facility] LAB CV    Family History   Problem Relation Age of Onset     Coronary Artery Disease Mother     Social History     Socioeconomic History     Marital status:      Spouse name: Not on file     Number of children: Not on file     Years of education: Not on file     Highest education level: Not on file   Occupational History     Not on file   Tobacco Use     Smoking status: Former     Smokeless tobacco: Never   Vaping Use     Vaping status: Never Used   Substance and Sexual Activity     Alcohol use: Yes     Alcohol/week: 10.0 - 15.0 standard drinks of alcohol     Drug use: Never     Sexual activity: Not Currently     Partners: Female   Other Topics Concern     Not on file   Social History Narrative     Not on file     Social Determinants of Health     Financial Resource Strain: Low Risk  (5/12/2023)    Received from [facility]    Overall Financial Resource Strain (CARDIA)      Difficulty of Paying Living Expenses: Not very hard   Food Insecurity: No Food Insecurity (5/30/2024)    Received from [facility]    Hunger Vital Sign      Worried About Running Out of Food in the Last Year: Never true      Ran Out of Food in the Last Year: Never true   Transportation Needs: No Transportation Needs (5/30/2024)    Received from [facility]    PRAPARE - Transportation      Lack of Transportation (Medical): No      Lack of Transportation (Non-Medical): No   Physical Activity: Sufficiently Active (5/30/2024)    Received from [facility]    Exercise Vital Sign      Days of Exercise per Week: 5 days      Minutes of Exercise per Session: 90 min   Stress: No Stress Concern Present (5/12/2023)    Received from [facility]    [place] [employer] - Occupational Stress Questionnaire      Feeling of Stress : Not at all   Social Connections: Socially Integrated (5/12/2023)    Received from [facility]    Social  Connection and Isolation Panel [NHANES]      Frequency of Communication with Friends and Family: More than three times a week      Frequency of Social Gatherings with Friends and Family: More than three times a week      Attends Anabaptism Services: 1 to 4 times per year      Active Member of Clubs or Organizations: Yes      Attends Club or Organization Meetings: More than 4 times per year      Marital Status:    Interpersonal Safety: Not At Risk (5/12/2023)    Received from [facility]    Humiliation, Afraid, Rape, and Kick questionnaire      Fear of Current or Ex-Partner: No      Emotionally Abused: No      Physically Abused: No      Sexually Abused: No   Housing Stability: Low Risk  (5/30/2024)    Received from [facility]    Housing Stability      What is your living situation today?: I have a steady place to live          Medications  Allergies   Current Outpatient Medications   Medication Sig Dispense Refill     aspirin (ASA) 81 MG EC tablet Take 81 mg by mouth daily       betamethasone valerate (VALISONE) 0.1 % ointment [BETAMETHASONE VALERATE (VALISONE) 0.1 % OINTMENT] Apply 1 application topically 2 (two) times a day as needed.       blood glucose (CONTOUR NEXT TEST) test strip Dispense item covered by pt ins. E11.9 NIDDM type II - Test 1 time/day       Cyanocobalamin (VITAMIN B 12) 250 MCG LOZG Take 25 mg by mouth daily.       isosorbide mononitrate (IMDUR) 30 MG 24 hr tablet Take 2 tablets (60 mg) by mouth daily 30 tablet 11     losartan (COZAAR) 25 MG tablet [LOSARTAN (COZAAR) 25 MG TABLET] TAKE 1 TABLET (25 MG TOTAL) BY MOUTH DAILY. 90 tablet 1     metoprolol succinate (TOPROL-XL) 100 MG 24 hr tablet [METOPROLOL SUCCINATE (TOPROL-XL) 100 MG 24 HR TABLET] TAKE 1 TABLET (100 MG TOTAL) BY MOUTH DAILY. (Patient taking differently: Take 150 mg by mouth daily.) 90 tablet 1     morphine 10 MG/5ML solution as needed       neomycin-bacitracin-polymyxin (NEOSPORIN) ointment [NEOMYCIN-BACITRACIN-POLYMYXIN  "(NEOSPORIN) OINTMENT] Apply to penis at catheter insertion site three times a day while catheter is in place. 15 g 0     nitroglycerin (NITROSTAT) 0.4 MG SL tablet [NITROGLYCERIN (NITROSTAT) 0.4 MG SL TABLET] Place 0.4 mg under the tongue every 5 (five) minutes as needed for chest pain.       omeprazole (PRILOSEC) 20 MG capsule [OMEPRAZOLE (PRILOSEC) 20 MG CAPSULE] Take 20 mg by mouth as needed (usually takes about 1x week).        ondansetron (ZOFRAN-ODT) 4 MG disintegrating tablet [ONDANSETRON (ZOFRAN-ODT) 4 MG DISINTEGRATING TABLET] Take 4 mg by mouth.       Vitamin D3 (VITAMIN D, CHOLECALCIFEROL,) 25 mcg (1000 units) tablet Take 5,000 mcg by mouth daily.       aspirin (ASA) 81 MG chewable tablet Take 1 tablet (81 mg) by mouth daily Starting tomorrow. (Patient not taking: Reported on 9/3/2024) 30 tablet 3     docusate sodium (COLACE) 50 MG capsule [DOCUSATE SODIUM (COLACE) 50 MG CAPSULE] Take by mouth 2 (two) times a day as needed for constipation. (Patient not taking: Reported on 9/3/2024)       rosuvastatin (CRESTOR) 40 MG tablet Take 1 tablet (40 mg) by mouth At Bedtime (Patient taking differently: Take 20 mg by mouth at bedtime.) 90 tablet 3    Allergies   Allergen Reactions     Ace Inhibitors Cough     Metformin Diarrhea         Lab Results    Chemistry/lipid CBC Cardiac Enzymes/BNP/TSH/INR   Lab Results   Component Value Date    CHOL 191 03/22/2023    HDL 40 03/22/2023    TRIG 369 (H) 03/22/2023    BUN 20.7 03/21/2023     03/21/2023    CO2 22 03/21/2023    Lab Results   Component Value Date    WBC 6.0 03/21/2023    HGB 13.7 03/21/2023    HCT 39.4 (L) 03/21/2023    MCV 94 03/21/2023     03/21/2023    No results found for: \"CKTOTAL\", \"CKMB\", \"TROPONINI\", \"BNP\", \"TSH\", \"INR\"                                             Thank you for allowing me to participate in the care of your patient.      Sincerely,     Fawad Andres MD     [facility]  cc:   Fawad Andres MD  [address] [facility] [address]  [place] [address]

## 2024-09-03 NOTE — LETTER
September 18, 2024      Buddy Orellanaguillermo  [address] [facility] [address]        Dear ,    We are writing to inform you of your test results. Labs okay.  Await echocardiogram and monitor.     Resulted Orders   TSH with free T4 reflex   Result Value Ref Range    TSH 1.59 0.30 - 4.20 uIU/mL   CBC with platelets   Result Value Ref Range    WBC Count 5.1 4.0 - 11.0 10e3/uL    RBC Count 3.91 (L) 4.40 - 5.90 10e6/uL    Hemoglobin 10.7 (L) 13.3 - 17.7 g/dL    Hematocrit 33.3 (L) 40.0 - 53.0 %    MCV 85 78 - 100 fL    MCH 27.4 26.5 - 33.0 pg    MCHC 32.1 31.5 - 36.5 g/dL    RDW 16.6 (H) 10.0 - 15.0 %    Platelet Count 174 150 - 450 10e3/uL       If you have any questions or concerns, please call the clinic at the number listed above.       Sincerely,      Fawad Andres MD

## 2024-09-03 NOTE — PROGRESS NOTES
[facility]  [phone]          Assessment/Recommendations   Patient with known coronary artery disease with lesions in the right coronary artery distally as well as diagonal vessel.  Attempts at percutaneous intervention on these lesions was unsuccessful due to tortuosity of the vessels.    Patient also has a history of premature ventricular contractions and is having more palpitations and feels like every other day he gets an increase in palpitations which makes him quite fatigued.  He feels like every other day he is exhausted.    We will place a monitor to see if we can correlate symptoms with any rhythm disturbances and also do an echocardiogram to ensure he has not developed any reduction left ventricular systolic function.  We will also check a CBC and TSH today.    Will get back to him with the results of his studies and any further recommendations.       History of Present Illness/Subjective    Mr. Buddy Branch is a 73 year old male with known coronary artery disease as described above.  He has not had anginal symptoms but overall has quite a contrast in the way he feels day-to-day.  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  Another days he feels completely exhausted and sometimes to the point where he feels like to get a lift on the breath, but that is appropriate.  Other states that he is exhausted he does also feel more palpitations.  He has tried going up a bit on his metoprolol to 200 mg a day which seems to help with palpitations but he wonders if it contributes to some diarrhea.  He denies orthopnea, paroxysmal nocturnal dyspnea, peripheral edema or syncopal episodes.           Physical Examination Review of Systems   /73 (BP Location: Right arm, Patient Position: Sitting, Cuff Size: Adult Regular)   Pulse 76   Resp 14   Wt 83 kg (183 lb)   BMI 27.83 kg/m    Body mass index is 27.83  kg/m .  Wt Readings from Last 3 Encounters:   09/03/24 83 kg (183 lb)   05/04/23 86.5 kg (190 lb 9.6 oz)   03/22/23 83.9 kg (185 lb)     General Appearance:   Alert, cooperative and in no acute distress.   ENT/Mouth: Pink/moist oral mucosa   EYES:  no scleral icterus, normal conjunctivae   Neck: JVP normal. No Hepatojugular reflux. Thyroid not visualized.   Chest/Lungs:   Lungs are clear to auscultation, equal chest wall expansion.   Cardiovascular:   S1, S2 without murmur ,clicks or rubs. Brachial, radial  pulses are intact and symetric. No carotid bruits noted.  Regular rhythm   Abdomen:  Nontender. BS+. No bruits.   Extremities: No cyanosis, clubbing or edema   Skin: no xanthelasma, warm.    Neurologic: normal arm movement bilateral, no tremors     Psychiatric: Appropriate affect.      Enc Vitals  BP: 126/73  Pulse: 76  Resp: 14  Weight: 83 kg (183 lb)                                           Medical History  Surgical History Family History Social History   Past Medical History:   Diagnosis Date    Coronary artery disease     Hyperlipidemia     MI (myocardial infarction) (H)     PVC (premature ventricular contraction)     Past Surgical History:   Procedure Laterality Date    ANGIOPLASTY      CARDIAC CATHETERIZATION      CORONARY ANGIOPLASTY      CV CORONARY ANGIOGRAM N/A 8/28/2019    Procedure: Coronary Angiogram;  Surgeon: Odilon Roman MD;  Location: [facility] Cath Lab;  Service: Cardiology    CV CORONARY ANGIOGRAM N/A 3/22/2023    Procedure: Coronary Angiogram;  Surgeon: Elida Varghese MD;  Location: [facility] CV    CV LEFT HEART CATH N/A 3/22/2023    Procedure: Left Heart Catheterization;  Surgeon: Elida Varghese MD;  Location: [facility] LAB CV    CV PCI N/A 3/22/2023    Procedure: Percutaneous Coronary Intervention;  Surgeon: Elida Varghese MD;  Location: [facility] CV    Family History   Problem Relation Age of Onset    Coronary Artery Disease Mother     Social History      Socioeconomic History    Marital status:      Spouse name: Not on file    Number of children: Not on file    Years of education: Not on file    Highest education level: Not on file   Occupational History    Not on file   Tobacco Use    Smoking status: Former    Smokeless tobacco: Never   Vaping Use    Vaping status: Never Used   Substance and Sexual Activity    Alcohol use: Yes     Alcohol/week: 10.0 - 15.0 standard drinks of alcohol    Drug use: Never    Sexual activity: Not Currently     Partners: Female   Other Topics Concern    Not on file   Social History Narrative    Not on file     Social Determinants of Health     Financial Resource Strain: Low Risk  (5/12/2023)    Received from [facility]    Overall Financial Resource Strain (CARDIA)     Difficulty of Paying Living Expenses: Not very hard   Food Insecurity: No Food Insecurity (5/30/2024)    Received from [facility]    Hunger Vital Sign     Worried About Running Out of Food in the Last Year: Never true     Ran Out of Food in the Last Year: Never true   Transportation Needs: No Transportation Needs (5/30/2024)    Received from [facility]    PRAPARE - Transportation     Lack of Transportation (Medical): No     Lack of Transportation (Non-Medical): No   Physical Activity: Sufficiently Active (5/30/2024)    Received from [facility]    Exercise Vital Sign     Days of Exercise per Week: 5 days     Minutes of Exercise per Session: 90 min   Stress: No Stress Concern Present (5/12/2023)    Received from [facility]    Citizen of [place] of Occupational Health - Occupational Stress Questionnaire     Feeling of Stress : Not at all   Social Connections: Socially Integrated (5/12/2023)    Received from [facility]    Social Connection and Isolation Panel [NHANES]     Frequency of Communication with Friends and Family: More than three times a week     Frequency of Social Gatherings with Friends and Family: More than three times a week     Attends Rastafari  Services: 1 to 4 times per year     Active Member of Clubs or Organizations: Yes     Attends Club or Organization Meetings: More than 4 times per year     Marital Status:    Interpersonal Safety: Not At Risk (5/12/2023)    Received from [facility]    Humiliation, Afraid, Rape, and Kick questionnaire     Fear of Current or Ex-Partner: No     Emotionally Abused: No     Physically Abused: No     Sexually Abused: No   Housing Stability: Low Risk  (5/30/2024)    Received from [facility]    Housing Stability     What is your living situation today?: I have a steady place to live          Medications  Allergies   Current Outpatient Medications   Medication Sig Dispense Refill    aspirin (ASA) 81 MG EC tablet Take 81 mg by mouth daily      betamethasone valerate (VALISONE) 0.1 % ointment [BETAMETHASONE VALERATE (VALISONE) 0.1 % OINTMENT] Apply 1 application topically 2 (two) times a day as needed.      blood glucose (CONTOUR NEXT TEST) test strip Dispense item covered by pt ins. E11.9 NIDDM type II - Test 1 time/day      Cyanocobalamin (VITAMIN B 12) 250 MCG LOZG Take 25 mg by mouth daily.      isosorbide mononitrate (IMDUR) 30 MG 24 hr tablet Take 2 tablets (60 mg) by mouth daily 30 tablet 11    losartan (COZAAR) 25 MG tablet [LOSARTAN (COZAAR) 25 MG TABLET] TAKE 1 TABLET (25 MG TOTAL) BY MOUTH DAILY. 90 tablet 1    metoprolol succinate (TOPROL-XL) 100 MG 24 hr tablet [METOPROLOL SUCCINATE (TOPROL-XL) 100 MG 24 HR TABLET] TAKE 1 TABLET (100 MG TOTAL) BY MOUTH DAILY. (Patient taking differently: Take 150 mg by mouth daily.) 90 tablet 1    morphine 10 MG/5ML solution as needed      neomycin-bacitracin-polymyxin (NEOSPORIN) ointment [NEOMYCIN-BACITRACIN-POLYMYXIN (NEOSPORIN) OINTMENT] Apply to penis at catheter insertion site three times a day while catheter is in place. 15 g 0    nitroglycerin (NITROSTAT) 0.4 MG SL tablet [NITROGLYCERIN (NITROSTAT) 0.4 MG SL TABLET] Place 0.4 mg under the tongue every 5 (five)  "minutes as needed for chest pain.      omeprazole (PRILOSEC) 20 MG capsule [OMEPRAZOLE (PRILOSEC) 20 MG CAPSULE] Take 20 mg by mouth as needed (usually takes about 1x week).       ondansetron (ZOFRAN-ODT) 4 MG disintegrating tablet [ONDANSETRON (ZOFRAN-ODT) 4 MG DISINTEGRATING TABLET] Take 4 mg by mouth.      Vitamin D3 (VITAMIN D, CHOLECALCIFEROL,) 25 mcg (1000 units) tablet Take 5,000 mcg by mouth daily.      aspirin (ASA) 81 MG chewable tablet Take 1 tablet (81 mg) by mouth daily Starting tomorrow. (Patient not taking: Reported on 9/3/2024) 30 tablet 3    docusate sodium (COLACE) 50 MG capsule [DOCUSATE SODIUM (COLACE) 50 MG CAPSULE] Take by mouth 2 (two) times a day as needed for constipation. (Patient not taking: Reported on 9/3/2024)      rosuvastatin (CRESTOR) 40 MG tablet Take 1 tablet (40 mg) by mouth At Bedtime (Patient taking differently: Take 20 mg by mouth at bedtime.) 90 tablet 3    Allergies   Allergen Reactions    Ace Inhibitors Cough    Metformin Diarrhea         Lab Results    Chemistry/lipid CBC Cardiac Enzymes/BNP/TSH/INR   Lab Results   Component Value Date    CHOL 191 03/22/2023    HDL 40 03/22/2023    TRIG 369 (H) 03/22/2023    BUN 20.7 03/21/2023     03/21/2023    CO2 22 03/21/2023    Lab Results   Component Value Date    WBC 6.0 03/21/2023    HGB 13.7 03/21/2023    HCT 39.4 (L) 03/21/2023    MCV 94 03/21/2023     03/21/2023    No results found for: \"CKTOTAL\", \"CKMB\", \"TROPONINI\", \"BNP\", \"TSH\", \"INR\"                                         "

## 2024-09-04 ENCOUNTER — ORDERS ONLY (AUTO-RELEASED) (OUTPATIENT)
Dept: CARDIOLOGY | Facility: CLINIC | Age: 74
End: 2024-09-04
Payer: MEDICARE

## 2024-09-04 DIAGNOSIS — I10 ESSENTIAL HYPERTENSION: ICD-10-CM

## 2024-09-04 DIAGNOSIS — I25.10 ATHEROSCLEROSIS OF NATIVE CORONARY ARTERY OF NATIVE HEART WITHOUT ANGINA PECTORIS: ICD-10-CM

## 2024-09-04 DIAGNOSIS — R53.82 CHRONIC FATIGUE: ICD-10-CM

## 2024-09-04 DIAGNOSIS — I49.3 FREQUENT PVCS: ICD-10-CM

## 2024-09-20 ENCOUNTER — HOSPITAL ENCOUNTER (OUTPATIENT)
Dept: CARDIOLOGY | Facility: HOSPITAL | Age: 74
Discharge: HOME | End: 2024-09-20
Attending: INTERNAL MEDICINE | Admitting: INTERNAL MEDICINE
Payer: MEDICARE

## 2024-09-20 DIAGNOSIS — I49.3 FREQUENT PVCS: ICD-10-CM

## 2024-09-20 DIAGNOSIS — R53.82 CHRONIC FATIGUE: ICD-10-CM

## 2024-09-20 DIAGNOSIS — I25.10 ATHEROSCLEROSIS OF NATIVE CORONARY ARTERY OF NATIVE HEART WITHOUT ANGINA PECTORIS: ICD-10-CM

## 2024-09-20 DIAGNOSIS — I10 ESSENTIAL HYPERTENSION: ICD-10-CM

## 2024-09-20 LAB — LVEF ECHO: NORMAL

## 2024-09-20 PROCEDURE — 255N000002 HC RX 255 OP 636: Performed by: INTERNAL MEDICINE

## 2024-09-20 PROCEDURE — 93306 TTE W/DOPPLER COMPLETE: CPT | Mod: 26 | Performed by: INTERNAL MEDICINE

## 2024-09-20 PROCEDURE — C8929 TTE W OR WO FOL WCON,DOPPLER: HCPCS

## 2024-09-20 RX ADMIN — PERFLUTREN 3 ML: 6.52 INJECTION, SUSPENSION INTRAVENOUS at 13:25

## 2024-09-23 PROCEDURE — 93244 EXT ECG>48HR<7D REV&INTERPJ: CPT | Performed by: INTERNAL MEDICINE

## 2025-04-06 ENCOUNTER — HEALTH MAINTENANCE LETTER (OUTPATIENT)
Age: 75
End: 2025-04-06

## (undated) DEVICE — CATH LAUNCHER 6FR EBU 3.5 LA6EBU35

## (undated) DEVICE — CATH RX TAKERU PTCA BALLOON 1.5X12MM DC-RY1512UA1

## (undated) DEVICE — INTRO MICRO MINI STICK 4FR STD NITINOL

## (undated) DEVICE — EXCHANGE WIRE .035 260 STAR/JFC/035/260/ M001491681

## (undated) DEVICE — ELECTRODE ADULT PACING MULTI P-211-M1

## (undated) DEVICE — MANIFOLD KIT ANGIO AUTOMATED 014613

## (undated) DEVICE — SHEATH GLIDE RADIAL 6FR 25CM .035

## (undated) DEVICE — CATH ANGIO INFINITI JL3.5 4FRX100CM 538418

## (undated) DEVICE — CATH DIAG 4FR JR 5.0 538423

## (undated) DEVICE — GUIDEWIRE STARTER .035INX150CM

## (undated) DEVICE — DEVICE INFLATION SYR W/ HEMOSTASIS VALVE 12IN EXT IN4904

## (undated) DEVICE — SHEATH GLIDE RADIAL 4FR 25CM 0.021

## (undated) DEVICE — CATH GUIDELINER 6FR 5571

## (undated) DEVICE — GUIDEWIRE ASAHI FIELDER XT 190

## (undated) DEVICE — KIT HAND CONTROL ACIST 014644 AR-P54

## (undated) DEVICE — GUIDEWIRE FORTE FLOPPY J TOP 34949-05J

## (undated) DEVICE — SYR ANGIOGRAPHY MULTIUSE KIT ACIST 014612

## (undated) DEVICE — CATH DIAG 4FR JL 4.5 538417

## (undated) DEVICE — CATH ANGIO INFINITI JL4 4FRX100CM 538420

## (undated) DEVICE — CATH LAUNCHER 6FR LA6EBU375

## (undated) DEVICE — CUSTOM PACK CORONARY SAN5BCRHEA

## (undated) DEVICE — CATH LAUNCHER 6FR JR 4.0 LA6JR40

## (undated) DEVICE — SLEEVE TR BAND RADIAL COMPRESSION DEVICE 24CM TRB24-REG

## (undated) RX ORDER — FENTANYL CITRATE 50 UG/ML
INJECTION, SOLUTION INTRAMUSCULAR; INTRAVENOUS
Status: DISPENSED
Start: 2023-03-22

## (undated) RX ORDER — DIAZEPAM 5 MG
TABLET ORAL
Status: DISPENSED
Start: 2023-03-22

## (undated) RX ORDER — ASPIRIN 81 MG/1
TABLET, CHEWABLE ORAL
Status: DISPENSED
Start: 2023-03-22